# Patient Record
Sex: FEMALE | Race: WHITE | NOT HISPANIC OR LATINO | Employment: FULL TIME | URBAN - METROPOLITAN AREA
[De-identification: names, ages, dates, MRNs, and addresses within clinical notes are randomized per-mention and may not be internally consistent; named-entity substitution may affect disease eponyms.]

---

## 2020-05-06 ENCOUNTER — OFFICE VISIT (OUTPATIENT)
Dept: OBSTETRICS AND GYNECOLOGY | Facility: CLINIC | Age: 45
End: 2020-05-06
Payer: COMMERCIAL

## 2020-05-06 VITALS
WEIGHT: 103 LBS | HEART RATE: 80 BPM | DIASTOLIC BLOOD PRESSURE: 82 MMHG | HEIGHT: 60 IN | SYSTOLIC BLOOD PRESSURE: 127 MMHG | BODY MASS INDEX: 20.22 KG/M2

## 2020-05-06 DIAGNOSIS — L08.9 STAPH SKIN INFECTION: ICD-10-CM

## 2020-05-06 DIAGNOSIS — Z01.419 WELL WOMAN EXAM WITH ROUTINE GYNECOLOGICAL EXAM: Primary | ICD-10-CM

## 2020-05-06 DIAGNOSIS — B95.8 STAPH SKIN INFECTION: ICD-10-CM

## 2020-05-06 PROCEDURE — 99396 PREV VISIT EST AGE 40-64: CPT | Mod: S$GLB,,, | Performed by: OBSTETRICS & GYNECOLOGY

## 2020-05-06 PROCEDURE — 99396 PR PREVENTIVE VISIT,EST,40-64: ICD-10-PCS | Mod: S$GLB,,, | Performed by: OBSTETRICS & GYNECOLOGY

## 2020-05-06 RX ORDER — SODIUM FLUORIDE/POTASSIUM NIT 1.1 %-5 %
PASTE (ML) DENTAL
COMMUNITY
Start: 2020-05-05

## 2020-05-06 RX ORDER — DOXYCYCLINE HYCLATE 50 MG/1
CAPSULE ORAL
COMMUNITY
Start: 2020-05-04 | End: 2022-08-23

## 2020-05-06 RX ORDER — CYCLOSPORINE 0.5 MG/ML
EMULSION OPHTHALMIC
COMMUNITY
End: 2022-08-23

## 2020-05-06 RX ORDER — SULFAMETHOXAZOLE AND TRIMETHOPRIM 400; 80 MG/1; MG/1
1 TABLET ORAL 2 TIMES DAILY
Qty: 14 TABLET | Refills: 0 | Status: SHIPPED | OUTPATIENT
Start: 2020-05-06 | End: 2020-05-16

## 2020-05-06 RX ORDER — LEVOTHYROXINE SODIUM 50 UG/1
TABLET ORAL
COMMUNITY

## 2020-05-06 RX ORDER — VILAZODONE HYDROCHLORIDE 40 MG/1
TABLET ORAL
COMMUNITY
Start: 2020-04-06 | End: 2021-06-09 | Stop reason: DRUGHIGH

## 2020-05-06 RX ORDER — ERENUMAB-AOOE 70 MG/ML
INJECTION SUBCUTANEOUS
COMMUNITY
Start: 2020-03-20

## 2020-05-06 RX ORDER — MIRABEGRON 50 MG/1
TABLET, FILM COATED, EXTENDED RELEASE ORAL
COMMUNITY
Start: 2020-05-05 | End: 2022-11-11 | Stop reason: SDUPTHER

## 2020-05-06 RX ORDER — RIZATRIPTAN BENZOATE 10 MG/1
TABLET ORAL
COMMUNITY
End: 2023-06-29

## 2020-05-06 NOTE — PROGRESS NOTES
Lakisha Helms is a 44 y.o. female   presents for a well woman exam.  She has no issues, problems, or complaints.    Past Medical History:   Diagnosis Date    Hypothyroidism     IC (interstitial cystitis)     Migraines     Osteopenia        Past Surgical History:   Procedure Laterality Date    ABLATION  2017    Staci    AUGMENTATION OF BREAST       SECTION WITH TUBAL LIGATION       SECTION, LOW TRANSVERSE      COLONOSCOPY         OB History    Para Term  AB Living   2 2       2   SAB TAB Ectopic Multiple Live Births                  # Outcome Date GA Lbr Dennis/2nd Weight Sex Delivery Anes PTL Lv   2 Para            1 Para                 Family History   Problem Relation Age of Onset    Diabetes Paternal Grandmother     Pulmonary fibrosis Maternal Grandmother     Lupus Maternal Grandmother     Diabetes Maternal Grandfather        Social History     Tobacco Use    Smoking status: Never Smoker    Smokeless tobacco: Never Used   Substance Use Topics    Alcohol use: Never     Frequency: Never    Drug use: Never         Current Outpatient Medications:     AIMOVIG AUTOINJECTOR 70 mg/mL injection, , Disp: , Rfl:     cycloSPORINE (RESTASIS) 0.05 % ophthalmic emulsion, Restasis 0.05 % eye drops in a dropperette, Disp: , Rfl:     doxycycline (VIBRAMYCIN) 50 MG capsule, , Disp: , Rfl:     levothyroxine (SYNTHROID) 50 MCG tablet, Synthroid 50 mcg tablet, Disp: , Rfl:     MYRBETRIQ 50 mg Tb24, , Disp: , Rfl:     PREVIDENT 5000 ENAMEL PROTECT 1.1-5 % Pste, , Disp: , Rfl:     rizatriptan (MAXALT) 10 MG tablet, rizatriptan 10 mg tablet, Disp: , Rfl:     sulfamethoxazole-trimethoprim 400-80mg (BACTRIM) 400-80 mg per tablet, Take 1 tablet by mouth 2 (two) times daily. for 10 days, Disp: 14 tablet, Rfl: 0    VIIBRYD 40 mg Tab tablet, , Disp: , Rfl:      Review of patient's allergies indicates:   Allergen Reactions    Pcn [penicillins]     Reglan [metoclopramide]          ROS:  GENERAL: Denies weight gain or weight loss. Feeling well overall.   SKIN: + rash on back of thighs   HEAD: Denies head injury or headache.   NODES: Denies enlarged lymph nodes.   CHEST: Denies shortness of breath.   CARDIOVASCULAR: Denies palpitations or chest pain.   ABDOMEN: Denies abdominal pain, constipation, diarrhea, nausea, vomiting or rectal bleeding.   URINARY: Denies frequency, dysuria, hematuria, or burning on urination.  REPRODUCTIVE: See HPI.   BREASTS: Denies pain, lumps, or nipple discharge.   HEMATOLOGIC: Denies easy bruisability or excessive bleeding.  MUSCULOSKELETAL: Denies joint pain or swelling.   NEUROLOGIC: Denies syncope or weakness.   PSYCHIATRIC: Denies depression, anxiety or mood swings.    PHYSICAL EXAM:  /82   Pulse 80   Ht 5' (1.524 m)   Wt 46.7 kg (103 lb)   LMP 04/22/2020   BMI 20.12 kg/m²    Body mass index is 20.12 kg/m².   APPEARANCE: Well nourished, well developed, in no acute distress.  AFFECT: WNL, alert and oriented x 3  SKIN: No acne or hirsutism  NECK: Neck symmetric without masses or thyromegaly  NODES: No inguinal, cervical, axillary, or femoral lymph node enlargement  CHEST: Good respiratory effect  ABDOMEN: Soft.  No tenderness or masses.  No hepatosplenomegaly.  No hernias.  BREASTS: Symmetrical, no skin changes or visible lesions.  No palpable masses, nipple discharge bilaterally.  PELVIC: Normal external genitalia without lesions.  Normal hair distribution.  Adequate perineal body, normal urethral meatus.  Urethra and bladder without tenderness or masses. Vagina moist and well rugated without lesions or discharge.  Cervix pink, without lesions, discharge or tenderness.  No significant cystocele or rectocele.  Bimanual exam shows uterus to be normal size, regular, mobile and nontender.  Adnexa without masses or tenderness.    EXTREMITIES: No edema. Papular rash on posterior aspect of thighs     Well woman exam with routine gynecological exam  -      Liquid-based pap smear, screening    Staph skin infection  -     sulfamethoxazole-trimethoprim 400-80mg (BACTRIM) 400-80 mg per tablet; Take 1 tablet by mouth 2 (two) times daily. for 10 days  Dispense: 14 tablet; Refill: 0         Patient was counseled today on A.C.S. Pap guidelines and recommendations for yearly pelvic exams, mammograms and monthly self breast exams; to see her PCP for other health maintenance.   Mammogram and labwork with her PMD Dr. Daphne Bates done in March.   Will have hemorrhoid banding later this year.  Follow up in 1 year

## 2021-05-10 ENCOUNTER — PATIENT MESSAGE (OUTPATIENT)
Dept: OBSTETRICS AND GYNECOLOGY | Facility: CLINIC | Age: 46
End: 2021-05-10

## 2021-06-09 ENCOUNTER — OFFICE VISIT (OUTPATIENT)
Dept: OBSTETRICS AND GYNECOLOGY | Facility: CLINIC | Age: 46
End: 2021-06-09
Payer: COMMERCIAL

## 2021-06-09 VITALS
WEIGHT: 101.81 LBS | BODY MASS INDEX: 19.88 KG/M2 | SYSTOLIC BLOOD PRESSURE: 121 MMHG | DIASTOLIC BLOOD PRESSURE: 85 MMHG

## 2021-06-09 DIAGNOSIS — Z01.419 WELL WOMAN EXAM WITH ROUTINE GYNECOLOGICAL EXAM: Primary | ICD-10-CM

## 2021-06-09 PROCEDURE — 3008F BODY MASS INDEX DOCD: CPT | Mod: CPTII,S$GLB,, | Performed by: OBSTETRICS & GYNECOLOGY

## 2021-06-09 PROCEDURE — 3008F PR BODY MASS INDEX (BMI) DOCUMENTED: ICD-10-PCS | Mod: CPTII,S$GLB,, | Performed by: OBSTETRICS & GYNECOLOGY

## 2021-06-09 PROCEDURE — 99396 PREV VISIT EST AGE 40-64: CPT | Mod: S$GLB,,, | Performed by: OBSTETRICS & GYNECOLOGY

## 2021-06-09 PROCEDURE — 99396 PR PREVENTIVE VISIT,EST,40-64: ICD-10-PCS | Mod: S$GLB,,, | Performed by: OBSTETRICS & GYNECOLOGY

## 2021-06-09 RX ORDER — VILAZODONE HYDROCHLORIDE 20 MG/1
1 TABLET ORAL DAILY
COMMUNITY

## 2022-06-16 ENCOUNTER — OFFICE VISIT (OUTPATIENT)
Dept: OBSTETRICS AND GYNECOLOGY | Facility: CLINIC | Age: 47
End: 2022-06-16
Payer: COMMERCIAL

## 2022-06-16 VITALS — DIASTOLIC BLOOD PRESSURE: 88 MMHG | SYSTOLIC BLOOD PRESSURE: 102 MMHG | WEIGHT: 96 LBS | BODY MASS INDEX: 18.75 KG/M2

## 2022-06-16 DIAGNOSIS — N76.0 BV (BACTERIAL VAGINOSIS): ICD-10-CM

## 2022-06-16 DIAGNOSIS — B96.89 BV (BACTERIAL VAGINOSIS): ICD-10-CM

## 2022-06-16 DIAGNOSIS — Z01.419 WELL WOMAN EXAM WITH ROUTINE GYNECOLOGICAL EXAM: Primary | ICD-10-CM

## 2022-06-16 DIAGNOSIS — N91.2 AMENORRHEA: ICD-10-CM

## 2022-06-16 PROCEDURE — 3079F DIAST BP 80-89 MM HG: CPT | Mod: CPTII,S$GLB,, | Performed by: OBSTETRICS & GYNECOLOGY

## 2022-06-16 PROCEDURE — 1159F PR MEDICATION LIST DOCUMENTED IN MEDICAL RECORD: ICD-10-PCS | Mod: CPTII,S$GLB,, | Performed by: OBSTETRICS & GYNECOLOGY

## 2022-06-16 PROCEDURE — 99396 PREV VISIT EST AGE 40-64: CPT | Mod: S$GLB,,, | Performed by: OBSTETRICS & GYNECOLOGY

## 2022-06-16 PROCEDURE — 3008F BODY MASS INDEX DOCD: CPT | Mod: CPTII,S$GLB,, | Performed by: OBSTETRICS & GYNECOLOGY

## 2022-06-16 PROCEDURE — 3079F PR MOST RECENT DIASTOLIC BLOOD PRESSURE 80-89 MM HG: ICD-10-PCS | Mod: CPTII,S$GLB,, | Performed by: OBSTETRICS & GYNECOLOGY

## 2022-06-16 PROCEDURE — 87210 SMEAR WET MOUNT SALINE/INK: CPT | Mod: QW,S$GLB,, | Performed by: OBSTETRICS & GYNECOLOGY

## 2022-06-16 PROCEDURE — 3074F SYST BP LT 130 MM HG: CPT | Mod: CPTII,S$GLB,, | Performed by: OBSTETRICS & GYNECOLOGY

## 2022-06-16 PROCEDURE — 3008F PR BODY MASS INDEX (BMI) DOCUMENTED: ICD-10-PCS | Mod: CPTII,S$GLB,, | Performed by: OBSTETRICS & GYNECOLOGY

## 2022-06-16 PROCEDURE — 87210 PR  SMEAR,STAIN,WET MNT,INTERP: ICD-10-PCS | Mod: QW,S$GLB,, | Performed by: OBSTETRICS & GYNECOLOGY

## 2022-06-16 PROCEDURE — 3074F PR MOST RECENT SYSTOLIC BLOOD PRESSURE < 130 MM HG: ICD-10-PCS | Mod: CPTII,S$GLB,, | Performed by: OBSTETRICS & GYNECOLOGY

## 2022-06-16 PROCEDURE — 99396 PR PREVENTIVE VISIT,EST,40-64: ICD-10-PCS | Mod: S$GLB,,, | Performed by: OBSTETRICS & GYNECOLOGY

## 2022-06-16 PROCEDURE — 1159F MED LIST DOCD IN RCRD: CPT | Mod: CPTII,S$GLB,, | Performed by: OBSTETRICS & GYNECOLOGY

## 2022-06-16 RX ORDER — CLINDAMYCIN HYDROCHLORIDE 300 MG/1
300 CAPSULE ORAL 2 TIMES DAILY
Qty: 14 CAPSULE | Refills: 0 | Status: SHIPPED | OUTPATIENT
Start: 2022-06-16 | End: 2022-08-23

## 2022-06-16 NOTE — PROGRESS NOTES
Lakisha Helms is a 46 y.o. female  who presents for a well woman exam.  She has no issues, problems, or complaints. No menses since last May.  Some vaginal discomfort.     Past Medical History:   Diagnosis Date    Abnormal Pap smear of cervix     De Quervain's tenosynovitis     Hx of colonoscopy     Hypothyroidism     IC (interstitial cystitis)     Migraines     Osteopenia     Ovarian torsion     PCOS (polycystic ovarian syndrome)     Staph skin infection        Past Surgical History:   Procedure Laterality Date    ABLATION      Staci    AUGMENTATION OF BREAST       SECTION WITH TUBAL LIGATION       SECTION, LOW TRANSVERSE      COLONOSCOPY      , 22- repeat in 3 years    HAND SURGERY      LAPAROSCOPY      LOOP ELECTROSURGICAL EXCISION PROCEDURE (LEEP)         OB History    Para Term  AB Living   2 2       2   SAB IAB Ectopic Multiple Live Births                  # Outcome Date GA Lbr Dennis/2nd Weight Sex Delivery Anes PTL Lv   2 Para            1 Para                Family History   Problem Relation Age of Onset    Diabetes Paternal Grandmother     Pulmonary fibrosis Maternal Grandmother     Lupus Maternal Grandmother     Diabetes Maternal Grandfather        Social History     Tobacco Use    Smoking status: Never Smoker    Smokeless tobacco: Never Used   Substance Use Topics    Alcohol use: Not Currently    Drug use: Never         Current Outpatient Medications:     AIMOVIG AUTOINJECTOR 70 mg/mL injection, , Disp: , Rfl:     cycloSPORINE (RESTASIS) 0.05 % ophthalmic emulsion, Restasis 0.05 % eye drops in a dropperette, Disp: , Rfl:     doxycycline (VIBRAMYCIN) 50 MG capsule, , Disp: , Rfl:     levothyroxine (SYNTHROID) 50 MCG tablet, Synthroid 50 mcg tablet, Disp: , Rfl:     MYRBETRIQ 50 mg Tb24, , Disp: , Rfl:     PREVIDENT 5000 ENAMEL PROTECT 1.1-5 % Pste, , Disp: , Rfl:     rizatriptan (MAXALT) 10 MG tablet, rizatriptan 10 mg tablet,  Disp: , Rfl:     vilazodone (VIIBRYD) 20 mg Tab, Take 1 tablet by mouth once daily., Disp: , Rfl:     clindamycin (CLEOCIN) 300 MG capsule, Take 1 capsule (300 mg total) by mouth 2 (two) times a day., Disp: 14 capsule, Rfl: 0     Review of patient's allergies indicates:   Allergen Reactions    Pcn [penicillins]     Reglan [metoclopramide]         ROS:  GENERAL: Denies weight gain or weight loss. Feeling well overall.   SKIN: Denies rash or lesions.   HEAD: Denies head injury or headache.   NODES: Denies enlarged lymph nodes.   CHEST: Denies shortness of breath.   CARDIOVASCULAR: Denies palpitations or chest pain.   ABDOMEN: Denies abdominal pain, constipation, diarrhea, nausea, vomiting or rectal bleeding.   URINARY: Denies frequency, dysuria, hematuria, or burning on urination.  REPRODUCTIVE: See HPI.   BREASTS: Denies pain, lumps, or nipple discharge.   HEMATOLOGIC: Denies easy bruisability or excessive bleeding.  MUSCULOSKELETAL: Denies joint pain or swelling.   NEUROLOGIC: Denies syncope or weakness.   PSYCHIATRIC: Denies depression, anxiety or mood swings.    PHYSICAL EXAM:    /88   Wt 43.5 kg (96 lb)   BMI 18.75 kg/m²    Body mass index is 18.75 kg/m².     APPEARANCE: Well nourished, well developed, in no acute distress.  AFFECT: WNL, alert and oriented x 3  SKIN: No acne or hirsutism  NECK: Neck symmetric without masses or thyromegaly  NODES: No inguinal, cervical, axillary, or femoral lymph node enlargement  CHEST: Good respiratory effect  ABDOMEN: Soft.  No tenderness or masses.  No hepatosplenomegaly.  No hernias.  BREASTS: Symmetrical, no skin changes or visible lesions.  No palpable masses, nipple discharge bilaterally.  PELVIC: Normal external genitalia without lesions.  Normal hair distribution.  Adequate perineal body, normal urethral meatus.  Urethra and bladder without tenderness or masses. Vagina moist and well rugated without lesions or discharge.  Cervix pink, without lesions,  discharge or tenderness.  No significant cystocele or rectocele.  Bimanual exam shows uterus to be normal size, regular, mobile and nontender.  Adnexa without masses or tenderness.    EXTREMITIES: No edema.     Wet prep: many clue cells; negative for yeast or trichomonas        Well woman exam with routine gynecological exam  -     Liquid-based pap smear, screening    Amenorrhea  -     Follicle Stimulating Hormone; Future; Expected date: 06/23/2022  -     Luteinizing Hormone; Future; Expected date: 06/23/2022  -     Testosterone; Future; Expected date: 06/23/2022    BV (bacterial vaginosis)  -     clindamycin (CLEOCIN) 300 MG capsule; Take 1 capsule (300 mg total) by mouth 2 (two) times a day.  Dispense: 14 capsule; Refill: 0  -     POCT WET PREP    Mammogram with her PCP    Patient was counseled today on A.C.S. Pap guidelines and recommendations for yearly pelvic exams, mammograms and monthly self breast exams; to see her PCP for other health maintenance.     Follow up in 1 year

## 2022-08-20 LAB
FSH: 167 MIU/ML
LH: 92.8 MIU/ML
MULTIPLE ORDERS: NORMAL
TESTOST SERPL-MCNC: <2.5 NG/DL (ref 8.4–48.1)

## 2022-08-23 ENCOUNTER — OFFICE VISIT (OUTPATIENT)
Dept: OBSTETRICS AND GYNECOLOGY | Facility: CLINIC | Age: 47
End: 2022-08-23
Payer: COMMERCIAL

## 2022-08-23 VITALS
BODY MASS INDEX: 19.17 KG/M2 | HEART RATE: 80 BPM | SYSTOLIC BLOOD PRESSURE: 120 MMHG | WEIGHT: 97.63 LBS | HEIGHT: 60 IN | DIASTOLIC BLOOD PRESSURE: 80 MMHG

## 2022-08-23 DIAGNOSIS — B96.89 BV (BACTERIAL VAGINOSIS): ICD-10-CM

## 2022-08-23 DIAGNOSIS — N95.2 VAGINAL ATROPHY: ICD-10-CM

## 2022-08-23 DIAGNOSIS — N76.0 BV (BACTERIAL VAGINOSIS): ICD-10-CM

## 2022-08-23 DIAGNOSIS — N95.1 MENOPAUSAL SYMPTOMS: Primary | ICD-10-CM

## 2022-08-23 PROCEDURE — 87210 SMEAR WET MOUNT SALINE/INK: CPT | Mod: QW,S$GLB,, | Performed by: OBSTETRICS & GYNECOLOGY

## 2022-08-23 PROCEDURE — 87210 PR  SMEAR,STAIN,WET MNT,INTERP: ICD-10-PCS | Mod: QW,S$GLB,, | Performed by: OBSTETRICS & GYNECOLOGY

## 2022-08-23 PROCEDURE — 99214 PR OFFICE/OUTPT VISIT, EST, LEVL IV, 30-39 MIN: ICD-10-PCS | Mod: S$GLB,,, | Performed by: OBSTETRICS & GYNECOLOGY

## 2022-08-23 PROCEDURE — 1159F PR MEDICATION LIST DOCUMENTED IN MEDICAL RECORD: ICD-10-PCS | Mod: CPTII,S$GLB,, | Performed by: OBSTETRICS & GYNECOLOGY

## 2022-08-23 PROCEDURE — 3008F BODY MASS INDEX DOCD: CPT | Mod: CPTII,S$GLB,, | Performed by: OBSTETRICS & GYNECOLOGY

## 2022-08-23 PROCEDURE — 3074F SYST BP LT 130 MM HG: CPT | Mod: CPTII,S$GLB,, | Performed by: OBSTETRICS & GYNECOLOGY

## 2022-08-23 PROCEDURE — 3074F PR MOST RECENT SYSTOLIC BLOOD PRESSURE < 130 MM HG: ICD-10-PCS | Mod: CPTII,S$GLB,, | Performed by: OBSTETRICS & GYNECOLOGY

## 2022-08-23 PROCEDURE — 3079F DIAST BP 80-89 MM HG: CPT | Mod: CPTII,S$GLB,, | Performed by: OBSTETRICS & GYNECOLOGY

## 2022-08-23 PROCEDURE — 99214 OFFICE O/P EST MOD 30 MIN: CPT | Mod: S$GLB,,, | Performed by: OBSTETRICS & GYNECOLOGY

## 2022-08-23 PROCEDURE — 1159F MED LIST DOCD IN RCRD: CPT | Mod: CPTII,S$GLB,, | Performed by: OBSTETRICS & GYNECOLOGY

## 2022-08-23 PROCEDURE — 3008F PR BODY MASS INDEX (BMI) DOCUMENTED: ICD-10-PCS | Mod: CPTII,S$GLB,, | Performed by: OBSTETRICS & GYNECOLOGY

## 2022-08-23 PROCEDURE — 3079F PR MOST RECENT DIASTOLIC BLOOD PRESSURE 80-89 MM HG: ICD-10-PCS | Mod: CPTII,S$GLB,, | Performed by: OBSTETRICS & GYNECOLOGY

## 2022-08-23 RX ORDER — ESTERIFIED ESTROGEN AND METHYLTESTOSTERONE .625; 1.25 MG/1; MG/1
1 TABLET ORAL DAILY
Qty: 30 TABLET | Refills: 5 | Status: SHIPPED | OUTPATIENT
Start: 2022-08-23 | End: 2022-12-01 | Stop reason: ALTCHOICE

## 2022-08-23 RX ORDER — ESTRADIOL 0.1 MG/G
1 CREAM VAGINAL
Qty: 42.5 G | Refills: 3 | Status: SHIPPED | OUTPATIENT
Start: 2022-08-25 | End: 2023-06-29

## 2022-08-23 RX ORDER — SPIRONOLACTONE 50 MG/1
50 TABLET, FILM COATED ORAL DAILY
COMMUNITY
Start: 2022-07-28

## 2022-08-23 RX ORDER — PROGESTERONE 100 MG/1
100 CAPSULE ORAL NIGHTLY
Qty: 30 CAPSULE | Refills: 5 | Status: SHIPPED | OUTPATIENT
Start: 2022-08-23 | End: 2023-03-09 | Stop reason: ALTCHOICE

## 2022-08-23 NOTE — PROGRESS NOTES
Chief Complaint: vaginal dryness     HPI:      Lakisha Helms is a 47 y.o. female  who presents complaining of vaginal dryness even after BV treatment.  She recently had labs showing menopause and she has not had a period since May 2021.  She also has hot flashes, night sweats, disrupted sleep and fatigue/anhedonia. She is interested in hormone replacement but worried about her history of severe acne.     Past Medical History:   Diagnosis Date    Abnormal Pap smear of cervix     Benign colon polyp     De Quervain's tenosynovitis     Diverticulosis     Hx of colonoscopy     Hypothyroidism     IC (interstitial cystitis)     Migraines     Osteopenia     Ovarian torsion     PCOS (polycystic ovarian syndrome)     Staph skin infection       Past Surgical History:   Procedure Laterality Date    ABLATION      Staci    AUGMENTATION OF BREAST       SECTION WITH TUBAL LIGATION       SECTION, LOW TRANSVERSE      COLONOSCOPY      , 22- repeat in 3 years    HAND SURGERY      LAPAROSCOPY      LOOP ELECTROSURGICAL EXCISION PROCEDURE (LEEP)      TUBAL LIGATION      WISDOM TOOTH EXTRACTION        Social History     Tobacco Use    Smoking status: Never Smoker    Smokeless tobacco: Never Used   Substance Use Topics    Alcohol use: Not Currently    Drug use: Never      Current Outpatient Medications on File Prior to Visit   Medication Sig Dispense Refill    AIMOVIG AUTOINJECTOR 70 mg/mL injection       levothyroxine (SYNTHROID) 50 MCG tablet Synthroid 50 mcg tablet      MYRBETRIQ 50 mg Tb24       PREVIDENT 5000 ENAMEL PROTECT 1.1-5 % Pste       rizatriptan (MAXALT) 10 MG tablet rizatriptan 10 mg tablet      spironolactone (ALDACTONE) 50 MG tablet Take 50 mg by mouth once daily.      vilazodone (VIIBRYD) 20 mg Tab Take 1 tablet by mouth once daily.      [DISCONTINUED] clindamycin (CLEOCIN) 300 MG capsule Take 1 capsule (300 mg total) by mouth 2 (two) times a day. 14  capsule 0    [DISCONTINUED] cycloSPORINE (RESTASIS) 0.05 % ophthalmic emulsion Restasis 0.05 % eye drops in a dropperette      [DISCONTINUED] doxycycline (VIBRAMYCIN) 50 MG capsule        No current facility-administered medications on file prior to visit.      Review of patient's allergies indicates:   Allergen Reactions    Pcn [penicillins]     Reglan [metoclopramide]           ROS:     GENERAL: Denies weight gain or weight loss. Feeling well overall.   SKIN: Denies rash or lesions.   NODES: Denies enlarged lymph nodes.   CARDIOVASCULAR: Denies palpitations or chest pain.   ABDOMEN: Denies abdominal pain, constipation, diarrhea, nausea, vomiting or rectal bleeding.   URINARY: Denies frequency, dysuria, hematuria, or burning on urination.  REPRODUCTIVE: See HPI.   BREASTS: Denies pain, lumps, or nipple discharge.   HEMATOLOGIC: Denies easy bruisability or excessive bleeding with the exception of menstrual cycles.  PSYCHIATRIC: Denies depression, anxiety or mood swings.   Physical Exam:      /80 (BP Location: Left arm, Patient Position: Sitting, BP Method: Medium (Manual))   Pulse 80   Ht 5' (1.524 m)   Wt 44.3 kg (97 lb 9.6 oz)   LMP 05/01/2021 (Approximate)   BMI 19.06 kg/m²   Body mass index is 19.06 kg/m².       PELVIC: Normal external genitalia without lesions.  Normal hair distribution.  Adequate perineal body, normal urethral meatus.  Urethra and bladder without tenderness or masses. Vagina atrophic without lesions or discharge.  Cervix pink, without lesions, discharge or tenderness.       Results:      Wet prep: few cells visible        Assessment/Plan:     Menopausal symptoms  -     estrogens,esterified,-methyltestosterone 0.625-1.25mg (ESTRATEST HS) per tablet; Take 1 tablet by mouth once daily.  Dispense: 30 tablet; Refill: 5  -     progesterone (PROMETRIUM) 100 MG capsule; Take 1 capsule (100 mg total) by mouth nightly.  Dispense: 30 capsule; Refill: 5    BV (bacterial vaginosis)  -      POCT WET PREP    Vaginal atrophy  -     estradioL (ESTRACE) 0.01 % (0.1 mg/gram) vaginal cream; Place 1 g vaginally twice a week.  Dispense: 42.5 g; Refill: 3

## 2022-10-06 ENCOUNTER — TELEPHONE (OUTPATIENT)
Dept: UROLOGY | Facility: CLINIC | Age: 47
End: 2022-10-06
Payer: COMMERCIAL

## 2022-10-06 NOTE — TELEPHONE ENCOUNTER
----- Message from Nathalie Mo sent at 10/6/2022  8:15 AM CDT -----  Regarding: Sooner Appointment  Contact: patient  Per phone call with patient  she stated that she is in a  IC Flare and she would like to have the DMSO treatment.  She had this when she was under Dr Bailey.  Please return call at [phone] (home).    Thanks,  SJ

## 2022-10-06 NOTE — TELEPHONE ENCOUNTER
Informed pt since she has not been seen by Dr. Mendiola and is considered a new pt she will need an appt before we can set up any treatments. She was ok with seeing NP, appt made pt agreed with date/time. lpn

## 2022-10-07 ENCOUNTER — TELEPHONE (OUTPATIENT)
Dept: OBSTETRICS AND GYNECOLOGY | Facility: CLINIC | Age: 47
End: 2022-10-07
Payer: COMMERCIAL

## 2022-10-07 DIAGNOSIS — R30.0 DYSURIA: Primary | ICD-10-CM

## 2022-10-07 NOTE — TELEPHONE ENCOUNTER
C/o lower pelvic pain/frequent urination at night/possible cystitis again per pt.  Pt. Does have upcoming appt. With urologist.    If you want to do Urinalysis she will go to [facility] ([facility])

## 2022-11-11 ENCOUNTER — OFFICE VISIT (OUTPATIENT)
Dept: UROLOGY | Facility: CLINIC | Age: 47
End: 2022-11-11
Payer: COMMERCIAL

## 2022-11-11 VITALS
BODY MASS INDEX: 19.2 KG/M2 | DIASTOLIC BLOOD PRESSURE: 81 MMHG | HEART RATE: 83 BPM | HEIGHT: 60 IN | WEIGHT: 97.81 LBS | SYSTOLIC BLOOD PRESSURE: 123 MMHG

## 2022-11-11 DIAGNOSIS — N32.81 OAB (OVERACTIVE BLADDER): ICD-10-CM

## 2022-11-11 DIAGNOSIS — N30.10 INTERSTITIAL CYSTITIS: Primary | ICD-10-CM

## 2022-11-11 PROCEDURE — 3074F SYST BP LT 130 MM HG: CPT | Mod: CPTII,S$GLB,, | Performed by: NURSE PRACTITIONER

## 2022-11-11 PROCEDURE — 99204 PR OFFICE/OUTPT VISIT, NEW, LEVL IV, 45-59 MIN: ICD-10-PCS | Mod: S$GLB,,, | Performed by: NURSE PRACTITIONER

## 2022-11-11 PROCEDURE — 1159F PR MEDICATION LIST DOCUMENTED IN MEDICAL RECORD: ICD-10-PCS | Mod: CPTII,S$GLB,, | Performed by: NURSE PRACTITIONER

## 2022-11-11 PROCEDURE — 3079F PR MOST RECENT DIASTOLIC BLOOD PRESSURE 80-89 MM HG: ICD-10-PCS | Mod: CPTII,S$GLB,, | Performed by: NURSE PRACTITIONER

## 2022-11-11 PROCEDURE — 3008F BODY MASS INDEX DOCD: CPT | Mod: CPTII,S$GLB,, | Performed by: NURSE PRACTITIONER

## 2022-11-11 PROCEDURE — 1159F MED LIST DOCD IN RCRD: CPT | Mod: CPTII,S$GLB,, | Performed by: NURSE PRACTITIONER

## 2022-11-11 PROCEDURE — 99204 OFFICE O/P NEW MOD 45 MIN: CPT | Mod: S$GLB,,, | Performed by: NURSE PRACTITIONER

## 2022-11-11 PROCEDURE — 3074F PR MOST RECENT SYSTOLIC BLOOD PRESSURE < 130 MM HG: ICD-10-PCS | Mod: CPTII,S$GLB,, | Performed by: NURSE PRACTITIONER

## 2022-11-11 PROCEDURE — 1160F RVW MEDS BY RX/DR IN RCRD: CPT | Mod: CPTII,S$GLB,, | Performed by: NURSE PRACTITIONER

## 2022-11-11 PROCEDURE — 1160F PR REVIEW ALL MEDS BY PRESCRIBER/CLIN PHARMACIST DOCUMENTED: ICD-10-PCS | Mod: CPTII,S$GLB,, | Performed by: NURSE PRACTITIONER

## 2022-11-11 PROCEDURE — 3079F DIAST BP 80-89 MM HG: CPT | Mod: CPTII,S$GLB,, | Performed by: NURSE PRACTITIONER

## 2022-11-11 PROCEDURE — 3008F PR BODY MASS INDEX (BMI) DOCUMENTED: ICD-10-PCS | Mod: CPTII,S$GLB,, | Performed by: NURSE PRACTITIONER

## 2022-11-11 RX ORDER — MIRABEGRON 50 MG/1
50 TABLET, FILM COATED, EXTENDED RELEASE ORAL DAILY
Qty: 90 TABLET | Refills: 3 | Status: SHIPPED | OUTPATIENT
Start: 2022-11-11 | End: 2023-02-28 | Stop reason: SDUPTHER

## 2022-11-11 RX ORDER — PRUCALOPRIDE 2 MG/1
TABLET, FILM COATED ORAL
COMMUNITY
Start: 2022-10-21

## 2022-11-11 NOTE — PROGRESS NOTES
Subjective:       Patient ID: Lakisha Helms is a 47 y.o. female.    Chief Complaint: Other (IC/former J pt /was having a flare when appt made-not now)      HPI: 47-year-old female, new to [facility].    Patient formally seen by Dr. Berry and Dr. Bailey.    She has a history of interstitial cystitis and overactive bladder.    Currently maintain on Myrbetriq 50 mg daily.      She has had DMSO treatments in the past.  She has also has hydrodistention in the past.    Reports better response to hydrodistention than DMSO.    She has experimented with an IC diet in the past with no significant improvement.    She does admit coffee can be an irritant.      At this time she is doing well with no complaints or complications.    States he had a recent flare up proximally 1 month ago.       Past Medical History:   Past Medical History:   Diagnosis Date    Abnormal Pap smear of cervix     Benign colon polyp     De Quervain's tenosynovitis     Diverticulosis     Hypothyroidism     IC (interstitial cystitis)     Migraines     Osteopenia     Ovarian torsion     PCOS (polycystic ovarian syndrome)     Staph skin infection        Past Surgical Historical:   Past Surgical History:   Procedure Laterality Date    ABLATION  2017    Staci    AUGMENTATION OF BREAST       SECTION WITH TUBAL LIGATION       SECTION, LOW TRANSVERSE      COLONOSCOPY      , 22- repeat in 3 years    HAND SURGERY      LAPAROSCOPY      LOOP ELECTROSURGICAL EXCISION PROCEDURE (LEEP)      TUBAL LIGATION      WISDOM TOOTH EXTRACTION          Medications:   Medication List with Changes/Refills   New Medications    METHEN-M.BLUE-S.PHOS-PHSAL-HYO (URIBEL) 118-10-40.8-36 MG CAP    Take 1 capsule by mouth 3 (three) times daily as needed (bladder pain).   Current Medications    AIMOVIG AUTOINJECTOR 70 MG/ML INJECTION        ESTRADIOL (ESTRACE) 0.01 % (0.1 MG/GRAM) VAGINAL CREAM    Place 1 g vaginally twice a week.     ESTROGENS,ESTERIFIED,-METHYLTESTOSTERONE 0.625-1.25MG (ESTRATEST HS) PER TABLET    Take 1 tablet by mouth once daily.    LEVOTHYROXINE (SYNTHROID) 50 MCG TABLET    Synthroid 50 mcg tablet    MOTEGRITY 2 MG TAB        PREVIDENT 5000 ENAMEL PROTECT 1.1-5 % PSTE        PROGESTERONE (PROMETRIUM) 100 MG CAPSULE    Take 1 capsule (100 mg total) by mouth nightly.    RIZATRIPTAN (MAXALT) 10 MG TABLET    rizatriptan 10 mg tablet    SPIRONOLACTONE (ALDACTONE) 50 MG TABLET    Take 50 mg by mouth once daily.    VILAZODONE (VIIBRYD) 20 MG TAB    Take 1 tablet by mouth once daily.   Changed and/or Refilled Medications    Modified Medication Previous Medication    MYRBETRIQ 50 MG TB24 MYRBETRIQ 50 mg Tb24       Take 1 tablet (50 mg total) by mouth Daily.            Past Social History:   Social History     Socioeconomic History    Marital status:    Tobacco Use    Smoking status: Never    Smokeless tobacco: Never   Substance and Sexual Activity    Alcohol use: Not Currently    Drug use: Never    Sexual activity: Yes     Partners: Male     Birth control/protection: None       Allergies:   Review of patient's allergies indicates:   Allergen Reactions    Pcn [penicillins]     Reglan [metoclopramide]         Family History:   Family History   Problem Relation Age of Onset    Diabetes Paternal Grandmother     Pulmonary fibrosis Maternal Grandmother     Lupus Maternal Grandmother     Diabetes Maternal Grandfather     Metabolic syndrome Father     Arthritis Father     Diabetes type II Mother         Review of Systems:  Review of Systems   Constitutional:  Negative for activity change and appetite change.   HENT:  Negative for congestion and dental problem.    Respiratory:  Negative for chest tightness and shortness of breath.    Cardiovascular:  Negative for chest pain.   Gastrointestinal:  Negative for abdominal distention and abdominal pain.   Genitourinary:  Negative for decreased urine volume, difficulty urinating,  dyspareunia, dysuria, enuresis, flank pain, frequency, genital sores, hematuria, pelvic pain and urgency.   Musculoskeletal:  Negative for back pain and neck pain.   Allergic/Immunologic: Negative for immunocompromised state.   Neurological:  Negative for dizziness.   Hematological:  Negative for adenopathy.   Psychiatric/Behavioral:  Negative for agitation, behavioral problems and confusion.      Physical Exam:  Physical Exam  Vitals and nursing note reviewed.   Constitutional:       Appearance: She is well-developed.   HENT:      Head: Normocephalic.   Eyes:      Pupils: Pupils are equal, round, and reactive to light.   Cardiovascular:      Rate and Rhythm: Normal rate and regular rhythm.      Heart sounds: Normal heart sounds.   Pulmonary:      Effort: Pulmonary effort is normal.      Breath sounds: Normal breath sounds.   Abdominal:      General: Bowel sounds are normal.      Palpations: Abdomen is soft.   Musculoskeletal:         General: Normal range of motion.      Cervical back: Normal range of motion and neck supple.   Skin:     General: Skin is warm and dry.   Neurological:      Mental Status: She is alert and oriented to person, place, and time.   Psychiatric:         Mood and Affect: Mood normal.         Behavior: Behavior normal.     Urinalysis:  Normal     Assessment/Plan:   Interstitial cystitis/overactive bladder:  Will refill patient's Myrbetriq 50 mg daily.  Patient will review IC diet.    We did discuss repeating DMSO or hydrodistention.  Also discussed IC 2 caps and Botox.    At this time patient is doing well.  He has been having IC and OB for a long time and does well for the most part.    Patient provided Uribel to use as needed.    Discussed the importance of hydration.    Patient will notify us for any flare-up or UTI symptoms.      Plan follow-up in 1 year, sooner if needed.  Problem List Items Addressed This Visit    None  Visit Diagnoses       Interstitial cystitis    -  Primary     Relevant Medications    methen-m.blue-s.phos-phsal-hyo (URIBEL) 118-10-40.8-36 mg Cap    MYRBETRIQ 50 mg Tb24    Other Relevant Orders    POCT Urinalysis (w/Micro Option)    OAB (overactive bladder)        Relevant Medications    methen-m.blue-s.phos-phsal-hyo (URIBEL) 118-10-40.8-36 mg Cap    MYRBETRIQ 50 mg Tb24    Other Relevant Orders    POCT Urinalysis (w/Micro Option)

## 2022-12-01 ENCOUNTER — TELEPHONE (OUTPATIENT)
Dept: OBSTETRICS AND GYNECOLOGY | Facility: CLINIC | Age: 47
End: 2022-12-01
Payer: COMMERCIAL

## 2022-12-01 DIAGNOSIS — N95.1 MENOPAUSAL SYMPTOMS: Primary | ICD-10-CM

## 2022-12-01 RX ORDER — TESTOSTERONE
POWDER (GRAM) MISCELLANEOUS
Qty: 30 G | Refills: 5 | Status: SHIPPED | OUTPATIENT
Start: 2022-12-01 | End: 2023-06-30

## 2022-12-01 RX ORDER — ESTRADIOL 0.5 MG/1
0.5 TABLET ORAL DAILY
Qty: 30 TABLET | Refills: 0 | Status: SHIPPED | OUTPATIENT
Start: 2022-12-01 | End: 2023-01-06

## 2022-12-01 NOTE — TELEPHONE ENCOUNTER
Patient instructed to d/c Estratest and will start estradiol instead. Will add low dose testosterone cream after acne has resolved

## 2022-12-01 NOTE — TELEPHONE ENCOUNTER
Started on HRT (low dose) so it wouldn't cause acne.  Pt. C/o since last week she has had 3 outbreaks (cysts).  Seeing Dr. Sage and he feels like it is hormone related.  Was put on Doxycycline and now has another one.  Should she decrease dose of Estratest?  Please advise.

## 2022-12-01 NOTE — TELEPHONE ENCOUNTER
----- Message from Daisy Way sent at 12/1/2022  1:55 PM CST -----  Contact: self  Pt called and asked for a call back regarding side effects she is having from her hormone medication.   Please call [phone]

## 2023-01-09 DIAGNOSIS — N95.1 MENOPAUSAL SYMPTOMS: ICD-10-CM

## 2023-01-09 RX ORDER — ESTRADIOL 0.5 MG/1
0.5 TABLET ORAL DAILY
Qty: 30 TABLET | Refills: 12 | Status: SHIPPED | OUTPATIENT
Start: 2023-01-09 | End: 2023-02-07 | Stop reason: SDUPTHER

## 2023-02-27 ENCOUNTER — PATIENT MESSAGE (OUTPATIENT)
Dept: OBSTETRICS AND GYNECOLOGY | Facility: CLINIC | Age: 48
End: 2023-02-27
Payer: COMMERCIAL

## 2023-02-27 DIAGNOSIS — N95.1 MENOPAUSAL SYMPTOMS: Primary | ICD-10-CM

## 2023-02-28 ENCOUNTER — PATIENT MESSAGE (OUTPATIENT)
Dept: OBSTETRICS AND GYNECOLOGY | Facility: CLINIC | Age: 48
End: 2023-02-28
Payer: COMMERCIAL

## 2023-02-28 DIAGNOSIS — N30.10 INTERSTITIAL CYSTITIS: ICD-10-CM

## 2023-02-28 DIAGNOSIS — N32.81 OAB (OVERACTIVE BLADDER): ICD-10-CM

## 2023-02-28 RX ORDER — MIRABEGRON 50 MG/1
50 TABLET, FILM COATED, EXTENDED RELEASE ORAL DAILY
Qty: 90 TABLET | Refills: 3 | Status: CANCELLED | OUTPATIENT
Start: 2023-02-28 | End: 2024-02-28

## 2023-02-28 RX ORDER — MIRABEGRON 50 MG/1
50 TABLET, FILM COATED, EXTENDED RELEASE ORAL DAILY
Qty: 30 TABLET | Refills: 0 | Status: SHIPPED | OUTPATIENT
Start: 2023-02-28 | End: 2023-03-30

## 2023-02-28 RX ORDER — MIRABEGRON 50 MG/1
50 TABLET, FILM COATED, EXTENDED RELEASE ORAL DAILY
Qty: 90 TABLET | Refills: 3 | Status: SHIPPED | OUTPATIENT
Start: 2023-02-28 | End: 2024-02-28

## 2023-03-06 LAB
E2: <12.2 PG/ML
FREE TESTOSTERONE: NORMAL
MULTIPLE ORDERS: NORMAL
PROGEST SERPL-MCNC: 0.67 NG/ML

## 2023-03-07 ENCOUNTER — PATIENT MESSAGE (OUTPATIENT)
Dept: OBSTETRICS AND GYNECOLOGY | Facility: CLINIC | Age: 48
End: 2023-03-07
Payer: COMMERCIAL

## 2023-03-07 ENCOUNTER — PATIENT MESSAGE (OUTPATIENT)
Dept: UROLOGY | Facility: CLINIC | Age: 48
End: 2023-03-07
Payer: COMMERCIAL

## 2023-03-07 DIAGNOSIS — N91.2 AMENORRHEA: Primary | ICD-10-CM

## 2023-03-08 ENCOUNTER — PROCEDURE VISIT (OUTPATIENT)
Dept: OBSTETRICS AND GYNECOLOGY | Facility: CLINIC | Age: 48
End: 2023-03-08
Payer: COMMERCIAL

## 2023-03-08 DIAGNOSIS — N91.2 AMENORRHEA: ICD-10-CM

## 2023-03-08 PROCEDURE — 76830 TRANSVAGINAL US NON-OB: CPT | Mod: S$GLB,,, | Performed by: OBSTETRICS & GYNECOLOGY

## 2023-03-08 PROCEDURE — 76830 US OB/GYN PROCEDURE (VIEWPOINT): ICD-10-PCS | Mod: S$GLB,,, | Performed by: OBSTETRICS & GYNECOLOGY

## 2023-03-09 ENCOUNTER — PATIENT MESSAGE (OUTPATIENT)
Dept: OBSTETRICS AND GYNECOLOGY | Facility: CLINIC | Age: 48
End: 2023-03-09
Payer: COMMERCIAL

## 2023-03-09 DIAGNOSIS — N95.1 MENOPAUSAL SYMPTOMS: Primary | ICD-10-CM

## 2023-03-09 RX ORDER — ESTRADIOL AND NORETHINDRONE ACETATE .5; .1 MG/1; MG/1
1 TABLET ORAL DAILY
Qty: 84 TABLET | Refills: 3 | Status: SHIPPED | OUTPATIENT
Start: 2023-03-09 | End: 2023-03-09 | Stop reason: DRUGHIGH

## 2023-03-09 RX ORDER — ESTRADIOL AND NORETHINDRONE ACETATE 1; .5 MG/1; MG/1
1 TABLET ORAL DAILY
Qty: 84 TABLET | Refills: 3 | Status: SHIPPED | OUTPATIENT
Start: 2023-03-09 | End: 2024-03-08

## 2023-05-26 ENCOUNTER — PATIENT MESSAGE (OUTPATIENT)
Dept: OBSTETRICS AND GYNECOLOGY | Facility: CLINIC | Age: 48
End: 2023-05-26
Payer: COMMERCIAL

## 2023-06-14 ENCOUNTER — PATIENT MESSAGE (OUTPATIENT)
Dept: OBSTETRICS AND GYNECOLOGY | Facility: CLINIC | Age: 48
End: 2023-06-14
Payer: COMMERCIAL

## 2023-06-29 RX ORDER — DOXYCYCLINE HYCLATE 50 MG/1
CAPSULE ORAL
COMMUNITY
Start: 2023-05-25

## 2023-06-29 RX ORDER — UBROGEPANT 50 MG/1
TABLET ORAL
COMMUNITY
Start: 2023-03-07

## 2023-06-30 ENCOUNTER — OFFICE VISIT (OUTPATIENT)
Dept: OBSTETRICS AND GYNECOLOGY | Facility: CLINIC | Age: 48
End: 2023-06-30
Payer: COMMERCIAL

## 2023-06-30 VITALS
HEIGHT: 60 IN | BODY MASS INDEX: 19.83 KG/M2 | SYSTOLIC BLOOD PRESSURE: 124 MMHG | WEIGHT: 101 LBS | DIASTOLIC BLOOD PRESSURE: 85 MMHG

## 2023-06-30 DIAGNOSIS — Z12.31 BREAST CANCER SCREENING BY MAMMOGRAM: ICD-10-CM

## 2023-06-30 DIAGNOSIS — Z01.419 ENCOUNTER FOR WELL WOMAN EXAM WITH ROUTINE GYNECOLOGICAL EXAM: Primary | ICD-10-CM

## 2023-06-30 DIAGNOSIS — N95.2 ATROPHIC VAGINITIS: ICD-10-CM

## 2023-06-30 PROCEDURE — 3074F SYST BP LT 130 MM HG: CPT | Mod: CPTII,S$GLB,, | Performed by: OBSTETRICS & GYNECOLOGY

## 2023-06-30 PROCEDURE — 3008F PR BODY MASS INDEX (BMI) DOCUMENTED: ICD-10-PCS | Mod: CPTII,S$GLB,, | Performed by: OBSTETRICS & GYNECOLOGY

## 2023-06-30 PROCEDURE — 3074F PR MOST RECENT SYSTOLIC BLOOD PRESSURE < 130 MM HG: ICD-10-PCS | Mod: CPTII,S$GLB,, | Performed by: OBSTETRICS & GYNECOLOGY

## 2023-06-30 PROCEDURE — 1159F PR MEDICATION LIST DOCUMENTED IN MEDICAL RECORD: ICD-10-PCS | Mod: CPTII,S$GLB,, | Performed by: OBSTETRICS & GYNECOLOGY

## 2023-06-30 PROCEDURE — 1159F MED LIST DOCD IN RCRD: CPT | Mod: CPTII,S$GLB,, | Performed by: OBSTETRICS & GYNECOLOGY

## 2023-06-30 PROCEDURE — 3008F BODY MASS INDEX DOCD: CPT | Mod: CPTII,S$GLB,, | Performed by: OBSTETRICS & GYNECOLOGY

## 2023-06-30 PROCEDURE — 99396 PREV VISIT EST AGE 40-64: CPT | Mod: S$GLB,,, | Performed by: OBSTETRICS & GYNECOLOGY

## 2023-06-30 PROCEDURE — 3079F DIAST BP 80-89 MM HG: CPT | Mod: CPTII,S$GLB,, | Performed by: OBSTETRICS & GYNECOLOGY

## 2023-06-30 PROCEDURE — 99396 PR PREVENTIVE VISIT,EST,40-64: ICD-10-PCS | Mod: S$GLB,,, | Performed by: OBSTETRICS & GYNECOLOGY

## 2023-06-30 PROCEDURE — 3079F PR MOST RECENT DIASTOLIC BLOOD PRESSURE 80-89 MM HG: ICD-10-PCS | Mod: CPTII,S$GLB,, | Performed by: OBSTETRICS & GYNECOLOGY

## 2023-06-30 NOTE — PROGRESS NOTES
CC:  WELL WOMAN (menopausal since )  Patient Care Team:  Daphen Bates MD as PCP - General (Internal Medicine)  Jessica Gao MD (Gastroenterology)  Pranav Hernandez NP as Nurse Practitioner (Urology)    NEW PATIENT       HPI:  Patient is a 48 y.o. who presents for her well woman exam today.  History reviewed with patient. Has tried estratest in the past and felt too irritable and had bad cystic acne. Got off the testosterone and changed jobs and feels fine. Has est vag cream which helps but doesn't like cleaning the applicator. Discussed other options and will use up the rest of her cream and then call and switch to something else. Dr carrillo sent out jjs test cream but never used and doesn't feel like she needs it. Will have mmg and dexa reports sent here  Patient is without complaints or concerns today.     HRT:  combination HRT and and testosterone  HX ABNL PAPS: normal after LEEP-     REVIEW OF PRIOR DATA/ HEALTH MAINTENANCE:  LAST ANNUAL:   2022    LAST MMG (screening)- May 2023- normal at [facility]    LAST LABS- up to date with PCP     LAST COLONOSCOPY- - by Dr. Gao - q 3 yrs for polyps   LAST DEXA- - normal at [facility]      Past Medical History:   Diagnosis Date    Abnormal Pap smear of cervix     Benign colon polyp     De Quervain's tenosynovitis     Diverticulosis     Hypothyroidism     IC (interstitial cystitis)     Migraines     Osteopenia     Ovarian torsion     PCOS (polycystic ovarian syndrome)     Staph skin infection      SURGICAL HX:   has a past surgical history that includes Augmentation of breast; Endometrial ablation ();  section with tubal ligation;  section, low transverse; Colonoscopy; Hand surgery; Loop electrosurgical excision procedure (LEEP) (); Laparoscopy; [place] tooth extraction; and Tubal ligation.    SOCIAL HX:    reports that she has never smoked. She has never used smokeless tobacco. She reports that she does not currently use  alcohol. She reports that she does not use drugs.    FAMILY HX:   family history includes Arthritis in her father; Diabetes in her maternal grandfather and paternal grandmother; Diabetes type II in her mother; Lupus in her maternal grandmother; Metabolic syndrome in her father; Pulmonary fibrosis in her maternal grandmother. .    ALLERGIES:  Pcn [penicillins] and Reglan [metoclopramide]    Current Outpatient Medications   Medication Instructions    AIMOVIG AUTOINJECTOR 70 mg/mL injection No dose, route, or frequency recorded.    doxycycline 50 MG capsule TAKE ONE CAPSULE ONCE A DAY MAY TAKE TWICE A DAY IF FLARE    estradiol-norethindrone (ACTIVELLA) 1-0.5 mg per tablet 1 tablet, Oral, Daily    levothyroxine (SYNTHROID) 50 MCG tablet Synthroid 50 mcg tablet    methen-m.blue-s.phos-phsal-hyo (URIBEL) 118-10-40.8-36 mg Cap 1 capsule, Oral, 3 times daily PRN    MOTEGRITY 2 mg Tab No dose, route, or frequency recorded.    MYRBETRIQ 50 mg, Oral, Daily    PREVIDENT 5000 ENAMEL PROTECT 1.1-5 % Pste No dose, route, or frequency recorded.    spironolactone (ALDACTONE) 50 mg, Oral, Daily    UBRELVY 50 mg tablet TAKE 1 TABLET BY MOUTH ONCE A DAY AS NEEDED FOR MIGRAINE HEADACHE    vilazodone (VIIBRYD) 20 mg Tab 1 tablet, Oral, Daily     ROS:  CONST:  No fever, chills, fatigue or unexpected changes in weight   CV: No chest pain or palpitations   RESP:  No shortness of breath or cough   GI: No abd pain, vomiting, diarrhea, blood in stool, or changes in bowel mvmts   SKIN: No rashes or lesions  MUSCULOSKELETAL: No joint swelling or pain   PSYCH: No changes in mood or insomia   BREASTS: No asymmetry, lumps, pain, nipple discharge, or skin changes   :  No dysuria, urgency, frequency, hematuria or incontinence           No vag dc, itching, odor or dryness           No pelvic pain, dyspareunia, or abnormal vaginal bleeding     VITALS:  Blood pressure 124/85, height 5' (1.524 m), weight 45.8 kg (101 lb), last menstrual period  05/01/2021.  Body mass index is 19.73 kg/m².     PHYSICAL EXAM-  APPEARANCE: Well appearing, in no acute distress.   NECK: Neck symmetric   CV:  Normal rate   PULM: Normal resp rate, no resp distress, normal resp effort   PSYCH:  Normal mood and affect, cooperative   SKIN: No rashes, lesions, or abnormal bruising   LYMPH: No inguinal or axillary adenopathy   ABD: Soft, without tenderness or masses.   BREAST: Symmetrical, no nipple changes, no skin changes, No palpable masses   PELVIC:  VULVA: Normal female genitalia. No lesions.   URETHRAL MEATUS: No masses, no significant prolapse.   BLADDER/ URETHRA: No masses or suprapubic tenderness   VAGINA: No lesions. +atrophic changes. No discharge   CVX: No lesions   UTERUS: Normal size/shape, mobile, non-tender   ADNEXA: No masses, tenderness, or fullness   ANUS/ PERINEUM: Normal tone.  No lesions.     *female chaperone present for entire exam    ASSESSMENT and PLAN:  Encounter for well woman exam with routine gynecological exam  -     Liquid-based pap smear, screening    Breast cancer screening by mammogram  -     Mammo Digital Screening Bilat w/ Neville; Future; Expected date: 07/14/2023    Atrophic vaginitis   - will call for something different after using up her estrace vaginal cream     FOLLOWUP:  1 year for wwe or sooner prn    COUNSELING:  Patient was counseled today on recommendation for yearly pelvic exam, current Pap guidelines, self breast exams, annual screening mammograms, routine screening colonoscopy , and bone density testing.  Discussed vitamin D and calcium supplements as well as weight bearing exercise to decrease risks. Encouraged patient to see her PCP for other health maintenance.

## 2023-07-06 LAB — Lab: NORMAL

## 2023-08-21 ENCOUNTER — PATIENT MESSAGE (OUTPATIENT)
Dept: OBSTETRICS AND GYNECOLOGY | Facility: CLINIC | Age: 48
End: 2023-08-21
Payer: COMMERCIAL

## 2023-08-21 NOTE — TELEPHONE ENCOUNTER
I would told take any more hormones until they figure out what is causing this. I would would recommend she get evaluated with her pcp or can go straight to an urgent care so they can evaluate that further.  There are many different possible causes ranging from allergic reaction to a stroke so she needs to get evaluated in person and please let us know what they find

## 2023-08-21 NOTE — TELEPHONE ENCOUNTER
I spoke with Ariadne and she will contact her pcp and stop the hormones for now until she finds out what is causing the facial swelling.

## 2023-09-05 ENCOUNTER — PATIENT MESSAGE (OUTPATIENT)
Dept: OBSTETRICS AND GYNECOLOGY | Facility: CLINIC | Age: 48
End: 2023-09-05
Payer: COMMERCIAL

## 2023-11-17 ENCOUNTER — OFFICE VISIT (OUTPATIENT)
Dept: UROLOGY | Facility: CLINIC | Age: 48
End: 2023-11-17
Payer: COMMERCIAL

## 2023-11-17 DIAGNOSIS — N30.10 INTERSTITIAL CYSTITIS: ICD-10-CM

## 2023-11-17 DIAGNOSIS — N32.81 OAB (OVERACTIVE BLADDER): ICD-10-CM

## 2023-11-17 LAB
BILIRUBIN, UA POC OHS: NEGATIVE
BLOOD, UA POC OHS: NEGATIVE
CLARITY, UA POC OHS: CLEAR
COLOR, UA POC OHS: YELLOW
GLUCOSE, UA POC OHS: NEGATIVE
KETONES, UA POC OHS: NEGATIVE
LEUKOCYTES, UA POC OHS: NEGATIVE
NITRITE, UA POC OHS: NEGATIVE
PH, UA POC OHS: 6.5
PROTEIN, UA POC OHS: NEGATIVE
SPECIFIC GRAVITY, UA POC OHS: 1.01
UROBILINOGEN, UA POC OHS: 0.2

## 2023-11-17 PROCEDURE — 81003 URINALYSIS AUTO W/O SCOPE: CPT | Mod: QW,S$GLB,, | Performed by: NURSE PRACTITIONER

## 2023-11-17 PROCEDURE — 1160F PR REVIEW ALL MEDS BY PRESCRIBER/CLIN PHARMACIST DOCUMENTED: ICD-10-PCS | Mod: CPTII,S$GLB,, | Performed by: NURSE PRACTITIONER

## 2023-11-17 PROCEDURE — 1159F MED LIST DOCD IN RCRD: CPT | Mod: CPTII,S$GLB,, | Performed by: NURSE PRACTITIONER

## 2023-11-17 PROCEDURE — 99214 OFFICE O/P EST MOD 30 MIN: CPT | Mod: S$GLB,,, | Performed by: NURSE PRACTITIONER

## 2023-11-17 PROCEDURE — 1160F RVW MEDS BY RX/DR IN RCRD: CPT | Mod: CPTII,S$GLB,, | Performed by: NURSE PRACTITIONER

## 2023-11-17 PROCEDURE — 1159F PR MEDICATION LIST DOCUMENTED IN MEDICAL RECORD: ICD-10-PCS | Mod: CPTII,S$GLB,, | Performed by: NURSE PRACTITIONER

## 2023-11-17 PROCEDURE — 99214 PR OFFICE/OUTPT VISIT, EST, LEVL IV, 30-39 MIN: ICD-10-PCS | Mod: S$GLB,,, | Performed by: NURSE PRACTITIONER

## 2023-11-17 PROCEDURE — 81003 POCT URINALYSIS(INSTRUMENT): ICD-10-PCS | Mod: QW,S$GLB,, | Performed by: NURSE PRACTITIONER

## 2023-11-17 NOTE — PROGRESS NOTES
Subjective:       Patient ID: Lakisha Helms is a 48 y.o. female.    Chief Complaint: Interstitial cystitis      HPI: 48-year-old female, established patient, presents for yearly visit.    Patient has history of interstitial cystitis.    Patient follows an IC diet.  Patient has been doing well for the most part.  She did have an infection a few months back.  She has had hydrodistention in the past.    Patient has history of overactive bladder.    Patient is maintained on Myrbetriq 50 mg daily.  Patient denies any significant frequency, urgency, or nocturia.    Patient denies any bladder pain.  Denies any pain or burning urination.  Denies any odor urine.  Denies any fever or body aches.      No other urinary complaints at this time.         Past Medical History:   Past Medical History:   Diagnosis Date    Abnormal Pap smear of cervix     Benign colon polyp     De Quervain's tenosynovitis     Diverticulosis     Hypothyroidism     IC (interstitial cystitis)     Migraines     Osteopenia     Ovarian torsion     PCOS (polycystic ovarian syndrome)     Staph skin infection        Past Surgical Historical:   Past Surgical History:   Procedure Laterality Date    AUGMENTATION OF BREAST       SECTION WITH TUBAL LIGATION       SECTION, LOW TRANSVERSE      COLONOSCOPY      , 21- repeat in 3 years    ENDOMETRIAL ABLATION  2017    Staci    HAND SURGERY      LAPAROSCOPY      LOOP ELECTROSURGICAL EXCISION PROCEDURE (LEEP)  1997    TUBAL LIGATION      WISDOM TOOTH EXTRACTION          Medications:   Medication List with Changes/Refills   Current Medications    AIMOVIG AUTOINJECTOR 70 MG/ML INJECTION        DOXYCYCLINE 50 MG CAPSULE    TAKE ONE CAPSULE ONCE A DAY MAY TAKE TWICE A DAY IF FLARE    ESTRADIOL-NORETHINDRONE (ACTIVELLA) 1-0.5 MG PER TABLET    Take 1 tablet by mouth once daily.    LEVOTHYROXINE (SYNTHROID) 50 MCG TABLET    Synthroid 50 mcg tablet    METHEN-M.BLUE-S.PHOS-PHSAL-HYO (URIBEL)  118-10-40.8-36 MG CAP    Take 1 capsule by mouth 3 (three) times daily as needed (bladder pain).    MOTEGRITY 2 MG TAB        MYRBETRIQ 50 MG TB24    Take 1 tablet (50 mg total) by mouth Daily.    PREVIDENT 5000 ENAMEL PROTECT 1.1-5 % PSTE        SPIRONOLACTONE (ALDACTONE) 50 MG TABLET    Take 50 mg by mouth once daily.    UBRELVY 50 MG TABLET    TAKE 1 TABLET BY MOUTH ONCE A DAY AS NEEDED FOR MIGRAINE HEADACHE    VILAZODONE (VIIBRYD) 20 MG TAB    Take 1 tablet by mouth once daily.        Past Social History:   Social History     Socioeconomic History    Marital status:    Tobacco Use    Smoking status: Never    Smokeless tobacco: Never   Substance and Sexual Activity    Alcohol use: Not Currently    Drug use: Never    Sexual activity: Yes     Partners: Male     Birth control/protection: None       Allergies:   Review of patient's allergies indicates:   Allergen Reactions    Pcn [penicillins]     Reglan [metoclopramide]         Family History:   Family History   Problem Relation Age of Onset    Diabetes Paternal Grandmother     Pulmonary fibrosis Maternal Grandmother     Lupus Maternal Grandmother     Diabetes Maternal Grandfather     Metabolic syndrome Father     Arthritis Father     Diabetes type II Mother         Review of Systems:  Review of Systems   Constitutional:  Negative for activity change and appetite change.   HENT:  Negative for congestion and dental problem.    Respiratory:  Negative for chest tightness and shortness of breath.    Cardiovascular:  Negative for chest pain.   Gastrointestinal:  Negative for abdominal distention and abdominal pain.   Genitourinary:  Negative for decreased urine volume, difficulty urinating, dyspareunia, dysuria, enuresis, flank pain, frequency, genital sores, hematuria, pelvic pain and urgency.   Musculoskeletal:  Negative for back pain and neck pain.   Allergic/Immunologic: Negative for immunocompromised state.   Neurological:  Negative for dizziness.    Hematological:  Negative for adenopathy.   Psychiatric/Behavioral:  Negative for agitation, behavioral problems and confusion.        Physical Exam:  Physical Exam  Vitals and nursing note reviewed.   Constitutional:       Appearance: She is well-developed.   HENT:      Head: Normocephalic.   Eyes:      Pupils: Pupils are equal, round, and reactive to light.   Cardiovascular:      Rate and Rhythm: Normal rate and regular rhythm.      Heart sounds: Normal heart sounds.   Pulmonary:      Effort: Pulmonary effort is normal.      Breath sounds: Normal breath sounds.   Abdominal:      General: Bowel sounds are normal.      Palpations: Abdomen is soft.   Musculoskeletal:         General: Normal range of motion.      Cervical back: Normal range of motion and neck supple.   Skin:     General: Skin is warm and dry.   Neurological:      Mental Status: She is alert and oriented to person, place, and time.   Psychiatric:         Mood and Affect: Mood normal.         Behavior: Behavior normal.       Urinalysis:  Normal   Bladder scan:  0 cc    Assessment/Plan:   1. Interstitial cystitis:  Patient is doing well with IC diet.    Notify us for any flare ups or signs or symptoms of infection.    2. Overactive bladder:  Patient is doing well on Myrbetriq 50 mg daily.    Patient will notify us for refills.      Will plan follow-up in 1 year, sooner if needed.  Problem List Items Addressed This Visit          Renal/    Interstitial cystitis    Overview     Patient maintained on IC diet.         Relevant Orders    POCT Urinalysis(Instrument)    POCT Bladder Scan    OAB (overactive bladder)    Overview     Maintained on Myrbetriq 50 mg daily         Relevant Orders    POCT Urinalysis(Instrument)    POCT Bladder Scan

## 2024-04-17 ENCOUNTER — TELEPHONE (OUTPATIENT)
Dept: UROLOGY | Facility: CLINIC | Age: 49
End: 2024-04-17
Payer: COMMERCIAL

## 2024-04-17 ENCOUNTER — PATIENT MESSAGE (OUTPATIENT)
Dept: OBSTETRICS AND GYNECOLOGY | Facility: CLINIC | Age: 49
End: 2024-04-17
Payer: COMMERCIAL

## 2024-04-17 NOTE — TELEPHONE ENCOUNTER
Spoke with patient in regards to PA request. Pt has changed insurance from [employer] to [employer], already updated in chart. She spoke with a lady Amy from [facility] in [place] regarding her medication for the pharmacy and is waiting to hear back from her if a PA is needed or fixed. Pt is requesting Myrbetriq 50 mg samples until the PA is fixed. Per Pranav Hernandez, NP she can  4 weeks of 40 mg of Myrbetriq.       ----- Message from KelDoc sent at 4/17/2024 11:11 AM CDT -----  Contact: self  Patient is requesting a call back regarding needing prior authorization for MYRBETRIQ 50 mg Tb24 - pt is asking if the office has samples she can come  in the meantime. Please call back at [phone]

## 2024-05-28 ENCOUNTER — PATIENT MESSAGE (OUTPATIENT)
Dept: UROLOGY | Facility: CLINIC | Age: 49
End: 2024-05-28
Payer: COMMERCIAL

## 2024-07-10 ENCOUNTER — PATIENT MESSAGE (OUTPATIENT)
Dept: UROLOGY | Facility: CLINIC | Age: 49
End: 2024-07-10
Payer: COMMERCIAL

## 2024-07-15 ENCOUNTER — TELEPHONE (OUTPATIENT)
Dept: UROLOGY | Facility: CLINIC | Age: 49
End: 2024-07-15
Payer: COMMERCIAL

## 2024-07-15 DIAGNOSIS — N32.81 OAB (OVERACTIVE BLADDER): Primary | ICD-10-CM

## 2024-07-15 DIAGNOSIS — N30.10 INTERSTITIAL CYSTITIS: ICD-10-CM

## 2024-07-15 RX ORDER — MIRABEGRON 50 MG/1
50 TABLET, FILM COATED, EXTENDED RELEASE ORAL DAILY
Qty: 90 TABLET | Refills: 3 | Status: SHIPPED | OUTPATIENT
Start: 2024-07-15 | End: 2025-07-15

## 2024-07-15 NOTE — TELEPHONE ENCOUNTER
Pt was calling about insurance changing and sending new script for myrbetriq. Insurance updated and medication sent out.    ----- Message from Eigenta sent at 7/15/2024 11:30 AM CDT -----  Contact: self  Patient is requesting a call back regarding MYRBETRIQ 50 mg Tb24 - needs prior authorization (has new insurance). Please call back at [phone]

## 2024-09-18 ENCOUNTER — PATIENT MESSAGE (OUTPATIENT)
Dept: OBSTETRICS AND GYNECOLOGY | Facility: CLINIC | Age: 49
End: 2024-09-18
Payer: COMMERCIAL

## 2024-11-15 ENCOUNTER — OFFICE VISIT (OUTPATIENT)
Dept: UROLOGY | Facility: CLINIC | Age: 49
End: 2024-11-15
Payer: COMMERCIAL

## 2024-11-15 VITALS
WEIGHT: 101 LBS | HEART RATE: 67 BPM | BODY MASS INDEX: 19.83 KG/M2 | SYSTOLIC BLOOD PRESSURE: 110 MMHG | HEIGHT: 60 IN | OXYGEN SATURATION: 98 % | DIASTOLIC BLOOD PRESSURE: 68 MMHG

## 2024-11-15 DIAGNOSIS — N30.10 INTERSTITIAL CYSTITIS: Primary | ICD-10-CM

## 2024-11-15 DIAGNOSIS — N32.81 OAB (OVERACTIVE BLADDER): ICD-10-CM

## 2024-11-15 NOTE — PROGRESS NOTES
Subjective:       Patient ID: Lakisha Helms is a 49 y.o. female.    Chief Complaint: No chief complaint on file.      HPI: 49-year-old female, established patient, presents for yearly visit.  Patient has history of interstitial cystitis.    Patient manages on an IC diet.    She denies any recent flare-ups.      She has history of overactive bladder.  She was maintained on Myrbetriq 50 mg daily.    She denies any significant frequency, urgency, or nocturia.  Denies any bladder pain or bladder spasms.  Denies any odor urine denies any fever or body aches.  Denies any blood in urine.    No other urinary complaints at this time.         Past Medical History:   Past Medical History:   Diagnosis Date    Abnormal Pap smear of cervix     Benign colon polyp     De Quervain's tenosynovitis     Diverticulosis     Hypothyroidism     IC (interstitial cystitis)     Migraines     Osteopenia     Ovarian torsion     PCOS (polycystic ovarian syndrome)     Staph skin infection        Past Surgical Historical:   Past Surgical History:   Procedure Laterality Date    AUGMENTATION OF BREAST       SECTION WITH TUBAL LIGATION       SECTION, LOW TRANSVERSE      COLONOSCOPY      , 21- repeat in 3 years    ENDOMETRIAL ABLATION  2017    Staci    HAND SURGERY      LAPAROSCOPY      LOOP ELECTROSURGICAL EXCISION PROCEDURE (LEEP)  1997    TUBAL LIGATION      WISDOM TOOTH EXTRACTION          Medications:   Medication List with Changes/Refills   Current Medications    AIMOVIG AUTOINJECTOR 70 MG/ML INJECTION        DOXYCYCLINE 50 MG CAPSULE    TAKE ONE CAPSULE ONCE A DAY MAY TAKE TWICE A DAY IF FLARE    ESTRADIOL-NORETHINDRONE (ACTIVELLA) 1-0.5 MG PER TABLET    Take 1 tablet by mouth once daily.    LEVOTHYROXINE (SYNTHROID) 50 MCG TABLET    Synthroid 50 mcg tablet    METHEN-M.BLUE-S.PHOS-PHSAL-HYO (URIBEL) 118-10-40.8-36 MG CAP    Take 1 capsule by mouth 3 (three) times daily as needed (bladder pain).    MOTEGRITY 2 MG TAB         MYRBETRIQ 50 MG TB24    Take 1 tablet (50 mg total) by mouth Daily.    PREVIDENT 5000 ENAMEL PROTECT 1.1-5 % PSTE        SPIRONOLACTONE (ALDACTONE) 50 MG TABLET    Take 50 mg by mouth once daily.    UBRELVY 50 MG TABLET    TAKE 1 TABLET BY MOUTH ONCE A DAY AS NEEDED FOR MIGRAINE HEADACHE    VILAZODONE (VIIBRYD) 20 MG TAB    Take 1 tablet by mouth once daily.        Past Social History:   Social History     Socioeconomic History    Marital status:    Tobacco Use    Smoking status: Never    Smokeless tobacco: Never   Substance and Sexual Activity    Alcohol use: Not Currently    Drug use: Never    Sexual activity: Yes     Partners: Male     Birth control/protection: None       Allergies:   Review of patient's allergies indicates:   Allergen Reactions    Pcn [penicillins]     Reglan [metoclopramide]         Family History:   Family History   Problem Relation Name Age of Onset    Diabetes Paternal Grandmother      Pulmonary fibrosis Maternal Grandmother      Lupus Maternal Grandmother      Diabetes Maternal Grandfather      Metabolic syndrome Father      Arthritis Father      Diabetes type II Mother          Review of Systems:  Review of Systems   Constitutional:  Negative for activity change and appetite change.   HENT:  Negative for congestion and dental problem.    Respiratory:  Negative for chest tightness and shortness of breath.    Cardiovascular:  Negative for chest pain.   Gastrointestinal:  Negative for abdominal distention.   Genitourinary:  Negative for decreased urine volume, difficulty urinating, dyspareunia, dysuria, enuresis, flank pain, frequency, genital sores, hematuria, pelvic pain and urgency.   Musculoskeletal:  Negative for back pain and neck pain.   Allergic/Immunologic: Negative for immunocompromised state.   Neurological:  Negative for dizziness.   Hematological:  Negative for adenopathy.   Psychiatric/Behavioral:  Negative for agitation, behavioral problems and confusion.         Physical Exam:  Physical Exam  Vitals and nursing note reviewed.   Constitutional:       Appearance: She is well-developed.   HENT:      Head: Normocephalic.   Cardiovascular:      Rate and Rhythm: Normal rate and regular rhythm.      Heart sounds: Normal heart sounds.   Pulmonary:      Effort: Pulmonary effort is normal.      Breath sounds: Normal breath sounds.   Abdominal:      General: Bowel sounds are normal.      Palpations: Abdomen is soft.   Skin:     General: Skin is warm and dry.   Neurological:      Mental Status: She is alert and oriented to person, place, and time.       Urinalysis: Normal    Assessment/Plan:   1. Interstitial cystitis: Patient is doing well with IC diet.    She will notify us for any flare ups.    2. Overactive bladder: Patient is doing well on Myrbetriq 50 mg daily.    She was medication on hand.  She will notify us for refills.      Follow-up 1 year, sooner if needed.    Problem List Items Addressed This Visit          Renal/    Interstitial cystitis - Primary    Overview     Patient maintained on IC diet.         Relevant Orders    POCT Urinalysis(Instrument)    OAB (overactive bladder)    Overview     Maintained on Myrbetriq 50 mg daily         Relevant Orders    POCT Urinalysis(Instrument)

## 2025-01-16 RX ORDER — DOXYCYCLINE HYCLATE 100 MG
100 TABLET ORAL 2 TIMES DAILY
COMMUNITY
Start: 2024-07-29

## 2025-01-16 RX ORDER — DOXYCYCLINE 40 MG/1
40 CAPSULE ORAL
COMMUNITY
Start: 2024-10-10

## 2025-01-16 RX ORDER — BENZOYL PEROXIDE 50 MG/G
CREAM TOPICAL
COMMUNITY
Start: 2024-08-14

## 2025-01-16 RX ORDER — BUPROPION HYDROCHLORIDE 150 MG/1
150 TABLET ORAL
COMMUNITY
Start: 2024-10-21

## 2025-01-16 NOTE — PROGRESS NOTES
CC:  WELL WOMAN (menopausal since )  Patient Care Team:  Daphne Bates MD as PCP - General (Internal Medicine)  Jessica Gao MD (Gastroenterology)  Pranav Hernandez NP as Nurse Practitioner (Urology)    Last visit with me was on  2023 for wwe    HPI:  Patient is a 49 y.o. who presents for her well woman exam today.  History reviewed with patient. Doing well with her hrt  Patient is without complaints or concerns today.     HRT: combination  HX ABNL PAPS: normal after LEEP-    REVIEW OF PRIOR DATA/ HEALTH MAINTENANCE:  LAST ANNUAL:   2023    LAST MMG- May 2023-  [facility]     LAST COLONOSCOPY- - by Dr. Gao - q 3 yrs for polyps   LAST DEXA- - normal at [facility]      Past Medical History:   Diagnosis Date    Abnormal Pap smear of cervix     Benign colon polyp     De Quervain's tenosynovitis     Diverticulosis     Hypothyroidism     IC (interstitial cystitis)     Migraines     OAB (overactive bladder)     Osteopenia     Ovarian torsion     PCOS (polycystic ovarian syndrome)     Staph skin infection      SURGICAL HX:   has a past surgical history that includes Augmentation of breast; Endometrial ablation ();  section with tubal ligation;  section, low transverse; Colonoscopy; Hand surgery; Loop electrosurgical excision procedure (LEEP) (); Laparoscopy; [place] tooth extraction; and Tubal ligation.    SOCIAL HX:    reports that she has never smoked. She has never used smokeless tobacco. She reports that she does not currently use alcohol. She reports that she does not use drugs.    Current Outpatient Medications   Medication Instructions    AIMOVIG AUTOINJECTOR 70 mg/mL injection No dose, route, or frequency recorded.    buPROPion (WELLBUTRIN XL) 150 mg    doxycycline (VIBRA-TABS) 100 mg, 2 times daily    EPSOLAY 5 % Crea APPLY TO AFFECTED AREA 2 TIMES A DAY. MORNING AND NIGHT. STOP IF IRRITATES.    estradiol-norethindrone (ACTIVELLA) 1-0.5 mg per tablet 1 tablet,  Oral, Daily    levothyroxine (SYNTHROID) 50 MCG tablet Synthroid 50 mcg tablet    methen-m.blue-s.phos-phsal-hyo (URIBEL) 118-10-40.8-36 mg Cap 1 capsule, Oral, 3 times daily PRN    MOTEGRITY 2 mg Tab No dose, route, or frequency recorded.    MYRBETRIQ 50 mg, Oral, Daily    ORACEA 40 mg    spironolactone (ALDACTONE) 50 mg, Daily    UBRELVY 50 mg tablet TAKE 1 TABLET BY MOUTH ONCE A DAY AS NEEDED FOR MIGRAINE HEADACHE    vilazodone (VIIBRYD) 20 mg Tab 1 tablet, Daily    vilazodone (VIIBRYD) 10 mg     VITALS:  Blood pressure 111/80, weight 41.2 kg (90 lb 12.8 oz), last menstrual period 05/01/2021.  Body mass index is 17.73 kg/m².     PHYSICAL EXAM-  APPEARANCE: Well appearing, in no acute distress.   CV/PULM: No resp distress, normal resp effort   PSYCH:  Normal mood and affect, cooperative   SKIN: No rashes, lesions, or abnormal bruising   ABD: Soft, without tenderness or masses.   BREAST: deferred/declined  PELVIC:  VULVA: Normal female genitalia. No lesions.   URETHRAL MEATUS: No masses, no significant prolapse.   BLADDER/ URETHRA: No masses or suprapubic tenderness   VAGINA/ CVX: No lesions. +atrophic changes. No discharge   UTERUS:  mobile, non-tender   ADNEXA: No masses, tenderness, or fullness   ANUS/ PERINEUM: Normal tone.  No lesions.           *patient verbally consented for exam and female chaperone present for entire exam     ASSESSMENT and PLAN:  Encounter for well woman exam with routine gynecological exam  -     Liquid-based pap smear, screening    Breast cancer screening by mammogram  -     Mammo Digital Screening Bilat w/ Neville; Future; Expected date: 01/30/2025    Hormone replacement therapy (HRT)    Menopausal symptoms  -     estradiol-norethindrone (ACTIVELLA) 1-0.5 mg per tablet; Take 1 tablet by mouth once daily.  Dispense: 84 tablet; Refill: 3       FOLLOWUP:  1 year for wwe or sooner prn    COUNSELING:  Patient was counseled today on recommendation for yearly pelvic exam, current Pap guidelines,  self breast exams, annual screening mammograms, routine screening colonoscopy , and bone density testing.  Discussed vitamin D and calcium supplements as well as weight bearing exercise to decrease risks. Encouraged patient to see her PCP for other health maintenance.

## 2025-01-17 ENCOUNTER — OFFICE VISIT (OUTPATIENT)
Dept: OBSTETRICS AND GYNECOLOGY | Facility: CLINIC | Age: 50
End: 2025-01-17
Payer: COMMERCIAL

## 2025-01-17 VITALS — WEIGHT: 90.81 LBS | SYSTOLIC BLOOD PRESSURE: 111 MMHG | BODY MASS INDEX: 17.73 KG/M2 | DIASTOLIC BLOOD PRESSURE: 80 MMHG

## 2025-01-17 DIAGNOSIS — N95.1 MENOPAUSAL SYMPTOMS: ICD-10-CM

## 2025-01-17 DIAGNOSIS — Z12.31 BREAST CANCER SCREENING BY MAMMOGRAM: ICD-10-CM

## 2025-01-17 DIAGNOSIS — Z01.419 ENCOUNTER FOR WELL WOMAN EXAM WITH ROUTINE GYNECOLOGICAL EXAM: Primary | ICD-10-CM

## 2025-01-17 DIAGNOSIS — Z79.890 HORMONE REPLACEMENT THERAPY (HRT): ICD-10-CM

## 2025-01-17 PROCEDURE — 3074F SYST BP LT 130 MM HG: CPT | Mod: CPTII,,, | Performed by: OBSTETRICS & GYNECOLOGY

## 2025-01-17 PROCEDURE — 1159F MED LIST DOCD IN RCRD: CPT | Mod: CPTII,,, | Performed by: OBSTETRICS & GYNECOLOGY

## 2025-01-17 PROCEDURE — 99396 PREV VISIT EST AGE 40-64: CPT | Mod: S$PBB,,, | Performed by: OBSTETRICS & GYNECOLOGY

## 2025-01-17 PROCEDURE — 3008F BODY MASS INDEX DOCD: CPT | Mod: CPTII,,, | Performed by: OBSTETRICS & GYNECOLOGY

## 2025-01-17 PROCEDURE — 3079F DIAST BP 80-89 MM HG: CPT | Mod: CPTII,,, | Performed by: OBSTETRICS & GYNECOLOGY

## 2025-01-17 RX ORDER — ESTRADIOL AND NORETHINDRONE ACETATE 1; .5 MG/1; MG/1
1 TABLET ORAL DAILY
Qty: 84 TABLET | Refills: 3 | Status: SHIPPED | OUTPATIENT
Start: 2025-01-17 | End: 2026-01-17

## 2025-01-17 RX ORDER — VILAZODONE HYDROCHLORIDE 10 MG/1
10 TABLET ORAL
COMMUNITY
Start: 2024-12-22 | End: 2025-02-03

## 2025-01-24 ENCOUNTER — PATIENT MESSAGE (OUTPATIENT)
Dept: OBSTETRICS AND GYNECOLOGY | Facility: CLINIC | Age: 50
End: 2025-01-24
Payer: COMMERCIAL

## 2025-01-24 LAB — Lab: NORMAL

## 2025-01-24 NOTE — PROGRESS NOTES
Please call patient and let her know that her pap came back as LGSIL+HPV and we need to schedule her for colpo in the near future.         If she has not had/completed her Gardasil vaccines, that is recommended. Current evidence suggests that it may boost her antibody response to this strain and will protect her from the other strains in the future. However, insurance doesn't normally cover after age 46 and I believe cash price was around $ 180

## 2025-01-30 ENCOUNTER — PATIENT MESSAGE (OUTPATIENT)
Dept: OBSTETRICS AND GYNECOLOGY | Facility: CLINIC | Age: 50
End: 2025-01-30
Payer: COMMERCIAL

## 2025-02-04 ENCOUNTER — PROCEDURE VISIT (OUTPATIENT)
Dept: OBSTETRICS AND GYNECOLOGY | Facility: CLINIC | Age: 50
End: 2025-02-04
Payer: COMMERCIAL

## 2025-02-04 VITALS
BODY MASS INDEX: 17.75 KG/M2 | SYSTOLIC BLOOD PRESSURE: 118 MMHG | DIASTOLIC BLOOD PRESSURE: 80 MMHG | WEIGHT: 90.38 LBS | HEIGHT: 60 IN

## 2025-02-04 DIAGNOSIS — R87.612 LGSIL ON PAP SMEAR OF CERVIX: Primary | ICD-10-CM

## 2025-02-04 PROCEDURE — 57454 BX/CURETT OF CERVIX W/SCOPE: CPT | Mod: S$PBB,,, | Performed by: OBSTETRICS & GYNECOLOGY

## 2025-02-04 PROCEDURE — 99499 UNLISTED E&M SERVICE: CPT | Mod: S$PBB,,, | Performed by: OBSTETRICS & GYNECOLOGY

## 2025-02-04 NOTE — PROCEDURES
Colposcopy    Date/Time: 2/4/2025 8:00 AM    Performed by: Miriam Manuel MD  Authorized by: Miriam Manuel MD    Consent obatined:  Prior to procedure the appropriate consent was completed and verified  Timeout:Immediately prior to procedure a time out was called to verify the correct patient, procedure, equipment, support staff and site/side marked as required  Assistants?: Yes    List of assistants:  Tayler clark ma   I was present for the entire procedure.    Colposcopy Site:  Cervix  Position:  Supine  Acrowhite Lesion? Yes    Atypical Vessels: No    Transformation Zone Adequate?: Yes    Biopsy?: Yes         Location:  Cervix ((7 00))  ECC Performed?: Yes    Estimated blood loss (cc):  0   Patient tolerated the procedure well with no immediate complications.   Patient was discharged and will follow up if any complications occur     Pt with remote hx of LEEP in her 20s but normal paps since.  and now with a new partner.  Never smoker. Considering gardasil although over 46- answered all questions. Pap lgsil +hpv

## 2025-02-06 ENCOUNTER — PATIENT MESSAGE (OUTPATIENT)
Dept: OBSTETRICS AND GYNECOLOGY | Facility: CLINIC | Age: 50
End: 2025-02-06
Payer: COMMERCIAL

## 2025-02-21 ENCOUNTER — CLINICAL SUPPORT (OUTPATIENT)
Dept: UROLOGY | Facility: CLINIC | Age: 50
End: 2025-02-21
Payer: COMMERCIAL

## 2025-02-21 DIAGNOSIS — N30.10 INTERSTITIAL CYSTITIS: Primary | ICD-10-CM

## 2025-02-21 LAB
BILIRUBIN, UA POC OHS: NEGATIVE
BLOOD, UA POC OHS: ABNORMAL
CLARITY, UA POC OHS: ABNORMAL
COLOR, UA POC OHS: YELLOW
GLUCOSE, UA POC OHS: NEGATIVE
KETONES, UA POC OHS: NEGATIVE
LEUKOCYTES, UA POC OHS: ABNORMAL
NITRITE, UA POC OHS: NEGATIVE
PH, UA POC OHS: 6
PROTEIN, UA POC OHS: NEGATIVE
SPECIFIC GRAVITY, UA POC OHS: <=1.005
UROBILINOGEN, UA POC OHS: 0.2

## 2025-02-21 RX ORDER — NITROFURANTOIN 25; 75 MG/1; MG/1
100 CAPSULE ORAL 2 TIMES DAILY
Qty: 14 CAPSULE | Refills: 0 | Status: SHIPPED | OUTPATIENT
Start: 2025-02-21 | End: 2025-02-28

## 2025-02-21 NOTE — PROGRESS NOTES
Ms. Banuelos came in an dropped off a urine to be tested we are sending it off to be cultured and will call her back with the results, the NP  did call her in some Uribel to help with the symptoms she's having, I called and left a voicemail about sending urine off to culture and that some meds were called into her pharmacy and we will call her back with the culture comes back in, and that if she had any questions to please return our call.

## 2025-02-22 LAB — URINE CULTURE, ROUTINE: NORMAL

## 2025-02-24 ENCOUNTER — RESULTS FOLLOW-UP (OUTPATIENT)
Dept: UROLOGY | Facility: CLINIC | Age: 50
End: 2025-02-24
Payer: COMMERCIAL

## 2025-02-24 NOTE — TELEPHONE ENCOUNTER
Patient was called and informed that she had E Coli noted on urine culture and continue to take the Macrobid as directed. Patient then stated the pain got so bad on Friday evening and also she noted blood in urine that she went to [facility] ER and was given IV rocephin and pain medication. She stated that they also did a CT with contrast and noted she had an inflamed bladder. She stated they said it happens with cystitis. Patient stated she is continuing the macrobid and taking some ibuprofen for the pain.     ----- Message from Pranav Hernandez NP sent at 2/24/2025  7:54 AM CST -----  E coli on urine culture.    Continue Macrobid as directed  ----- Message -----  From: Debra Jean  Sent: 2/21/2025   1:27 PM CST  To: Pranav Hernandez NP

## 2025-03-03 ENCOUNTER — CLINICAL SUPPORT (OUTPATIENT)
Dept: UROLOGY | Facility: CLINIC | Age: 50
End: 2025-03-03
Payer: COMMERCIAL

## 2025-03-03 ENCOUNTER — PATIENT MESSAGE (OUTPATIENT)
Dept: UROLOGY | Facility: CLINIC | Age: 50
End: 2025-03-03

## 2025-03-03 DIAGNOSIS — N30.10 INTERSTITIAL CYSTITIS: Primary | ICD-10-CM

## 2025-03-03 LAB
BILIRUBIN, UA POC OHS: NEGATIVE
BLOOD, UA POC OHS: ABNORMAL
CLARITY, UA POC OHS: ABNORMAL
COLOR, UA POC OHS: YELLOW
GLUCOSE, UA POC OHS: NEGATIVE
KETONES, UA POC OHS: NEGATIVE
LEUKOCYTES, UA POC OHS: ABNORMAL
NITRITE, UA POC OHS: NEGATIVE
PH, UA POC OHS: 6.5
PROTEIN, UA POC OHS: NEGATIVE
SPECIFIC GRAVITY, UA POC OHS: 1.01
UROBILINOGEN, UA POC OHS: 0.2

## 2025-03-03 RX ORDER — NITROFURANTOIN 25; 75 MG/1; MG/1
100 CAPSULE ORAL 2 TIMES DAILY
Qty: 20 CAPSULE | Refills: 0 | Status: SHIPPED | OUTPATIENT
Start: 2025-03-03 | End: 2025-03-13

## 2025-03-03 NOTE — PROGRESS NOTES
Ms Helms came in and dropped off a urine to the tested, we sent it off for a culture and she is going to get the meds that were sent in to the pharmacy for her. Macrobid, I made her a follow up apt on 03/14/2025 at 8 40 a.m. she verbalized understanding.

## 2025-03-05 ENCOUNTER — RESULTS FOLLOW-UP (OUTPATIENT)
Dept: UROLOGY | Facility: CLINIC | Age: 50
End: 2025-03-05
Payer: COMMERCIAL

## 2025-03-05 LAB — URINE CULTURE, ROUTINE: NORMAL

## 2025-03-05 NOTE — TELEPHONE ENCOUNTER
Patient was called and informed that she has low counts of E Coli noted on urine culture and to continue antibiotics as directed and to do a urine drop off 2-4 days after she has completed antibiotics. Patient verbalized understanding.    ----- Message from Pranav Hernandez NP sent at 3/5/2025  9:36 AM CST -----  Urine culture shows low count E coli.    Continue antibiotics as directed.    Drop off urine 2-4 days after completion of antibiotics  ----- Message -----  From: Kyra Castro  Sent: 3/3/2025   3:06 PM CST  To: Pranav Hernandez NP

## 2025-03-06 ENCOUNTER — PATIENT MESSAGE (OUTPATIENT)
Dept: OBSTETRICS AND GYNECOLOGY | Facility: CLINIC | Age: 50
End: 2025-03-06
Payer: COMMERCIAL

## 2025-03-14 ENCOUNTER — OFFICE VISIT (OUTPATIENT)
Dept: UROLOGY | Facility: CLINIC | Age: 50
End: 2025-03-14
Payer: COMMERCIAL

## 2025-03-14 VITALS
RESPIRATION RATE: 18 BRPM | DIASTOLIC BLOOD PRESSURE: 62 MMHG | SYSTOLIC BLOOD PRESSURE: 104 MMHG | OXYGEN SATURATION: 98 % | WEIGHT: 89 LBS | HEIGHT: 61 IN | HEART RATE: 82 BPM | BODY MASS INDEX: 16.8 KG/M2

## 2025-03-14 DIAGNOSIS — N30.10 INTERSTITIAL CYSTITIS: ICD-10-CM

## 2025-03-14 DIAGNOSIS — N39.0 POSTCOITAL UTI: ICD-10-CM

## 2025-03-14 DIAGNOSIS — N39.0 FREQUENT UTI: Primary | ICD-10-CM

## 2025-03-14 RX ORDER — NITROFURANTOIN 25; 75 MG/1; MG/1
100 CAPSULE ORAL DAILY PRN
Qty: 30 CAPSULE | Refills: 1 | Status: SHIPPED | OUTPATIENT
Start: 2025-03-14 | End: 2025-05-13

## 2025-03-14 NOTE — PROGRESS NOTES
Subjective:       Patient ID: Lakisha Helms is a 49 y.o. female.    Chief Complaint: intersitial cystitis and Urinary Tract Infection      HPI: 49-year-old female, established patient, presents for follow-up UTI.    Patient has history of frequent UTIs.  She also has a history of interstitial cystitis.    Patient presented in February with a complaint of UTI.    Urine culture showed E coli.  Patient was put on Macrobid for 7 days.  Urine culture showed greater than 100,000 count of E coli.  If completion of Macrobid she starts started have recurrence of symptoms.  She did drop off urine.  Urine culture showed Macrobid with a can not below 30,000. She was again put on Macrobid for 10 days.    Patient presents today stating she is doing well.  Denies any pain or burning urination.  Denies any odor to the urine.  Denies any fever or body aches.    Patient states that she thinks there may be a trend of infections with intercourse.    No other urinary complaints at this time         Past Medical History:   Past Medical History:   Diagnosis Date    Abnormal Pap smear of cervix     Benign colon polyp     De Quervain's tenosynovitis     Diverticulosis     Hypothyroidism     IC (interstitial cystitis)     Migraines     OAB (overactive bladder)     Osteopenia     Ovarian torsion     PCOS (polycystic ovarian syndrome)     Staph skin infection        Past Surgical Historical:   Past Surgical History:   Procedure Laterality Date    AUGMENTATION OF BREAST       SECTION WITH TUBAL LIGATION       SECTION, LOW TRANSVERSE      COLONOSCOPY      , 21- repeat in 3 years    ENDOMETRIAL ABLATION  2017    [facility]    HAND SURGERY      LAPAROSCOPY      LOOP ELECTROSURGICAL EXCISION PROCEDURE (LEEP)      WISDOM TOOTH EXTRACTION          Medications:   Medication List with Changes/Refills   New Medications    NITROFURANTOIN, MACROCRYSTAL-MONOHYDRATE, (MACROBID) 100 MG CAPSULE    Take 1 capsule (100 mg total) by  mouth daily as needed (postcoital).   Current Medications    BUPROPION (WELLBUTRIN XL) 150 MG TB24 TABLET    Take 150 mg by mouth.    DOXYCYCLINE (VIBRA-TABS) 100 MG TABLET    Take 100 mg by mouth 2 (two) times daily.    EPSOLAY 5 % CREA    APPLY TO AFFECTED AREA 2 TIMES A DAY. MORNING AND NIGHT. STOP IF IRRITATES.    ESTRADIOL-NORETHINDRONE (ACTIVELLA) 1-0.5 MG PER TABLET    Take 1 tablet by mouth once daily.    LEVOTHYROXINE (SYNTHROID) 50 MCG TABLET    Synthroid 50 mcg tablet    METHEN-M.BLUE-S.PHOS-PHSAL-HYO (URIBEL) 118-10-40.8-36 MG CAP    Take 1 capsule by mouth 3 (three) times daily as needed (bladder pain).    MOTEGRITY 2 MG TAB        MYRBETRIQ 50 MG TB24    Take 1 tablet (50 mg total) by mouth Daily.    ORACEA 40 MG CAPSULE    Take 40 mg by mouth.    SPIRONOLACTONE (ALDACTONE) 50 MG TABLET    Take 50 mg by mouth once daily.    UBRELVY 50 MG TABLET    TAKE 1 TABLET BY MOUTH ONCE A DAY AS NEEDED FOR MIGRAINE HEADACHE    VILAZODONE (VIIBRYD) 20 MG TAB    Take 1 tablet by mouth once daily.        Past Social History: Social History[1]    Allergies:   Review of patient's allergies indicates:   Allergen Reactions    Pcn [penicillins]     Reglan [metoclopramide]         Family History:   Family History   Problem Relation Name Age of Onset    Diabetes Paternal Grandmother      Pulmonary fibrosis Maternal Grandmother      Lupus Maternal Grandmother      Diabetes Maternal Grandfather      Metabolic syndrome Father      Arthritis Father      Diabetes type II Mother          Review of Systems:  Review of Systems   Constitutional:  Negative for activity change and appetite change.   HENT:  Negative for congestion and dental problem.    Respiratory:  Negative for chest tightness and shortness of breath.    Cardiovascular:  Negative for chest pain.   Gastrointestinal:  Negative for abdominal distention and abdominal pain.   Genitourinary:  Negative for decreased urine volume, difficulty urinating, dyspareunia, dysuria,  enuresis, flank pain, frequency, genital sores, hematuria, pelvic pain and urgency.   Musculoskeletal:  Negative for back pain and neck pain.   Allergic/Immunologic: Negative for immunocompromised state.   Neurological:  Negative for dizziness.   Hematological:  Negative for adenopathy.   Psychiatric/Behavioral:  Negative for agitation, behavioral problems and confusion.        Physical Exam:  Physical Exam  Vitals and nursing note reviewed.   Constitutional:       Appearance: She is well-developed.   HENT:      Head: Normocephalic.   Eyes:      Pupils: Pupils are equal, round, and reactive to light.   Cardiovascular:      Rate and Rhythm: Normal rate and regular rhythm.      Heart sounds: Normal heart sounds.   Pulmonary:      Effort: Pulmonary effort is normal.      Breath sounds: Normal breath sounds.   Abdominal:      General: Bowel sounds are normal.      Palpations: Abdomen is soft.   Musculoskeletal:         General: Normal range of motion.      Cervical back: Normal range of motion and neck supple.   Skin:     General: Skin is warm and dry.   Neurological:      Mental Status: She is alert and oriented to person, place, and time.   Psychiatric:         Mood and Affect: Mood normal.         Behavior: Behavior normal.       Urinalysis: Normal    Assessment/Plan:   1. Interstitial cystitis:  Patient is doing well at this time.  Patient continue IC diet.      2. Frequent UTI/postcoital UTI:  Will start patient on Macrobid as needed postcoital.  Patient will take 1 tab after intercourse.  She will take another next day if needed.    Patient has appointment in November.  She will keep that appointment as scheduled or sooner if needed  Problem List Items Addressed This Visit       Interstitial cystitis    Overview   Patient maintained on IC diet.         Relevant Medications    nitrofurantoin, macrocrystal-monohydrate, (MACROBID) 100 MG capsule    Other Relevant Orders    POCT Urinalysis(Instrument)     Other Visit  Diagnoses         Frequent UTI    -  Primary    Relevant Medications    nitrofurantoin, macrocrystal-monohydrate, (MACROBID) 100 MG capsule    Other Relevant Orders    POCT Urinalysis(Instrument)      Postcoital UTI                          [1]   Social History  Socioeconomic History    Marital status:    Tobacco Use    Smoking status: Never    Smokeless tobacco: Never   Substance and Sexual Activity    Alcohol use: Not Currently    Drug use: Never    Sexual activity: Yes     Partners: Male     Birth control/protection: None

## 2025-04-21 ENCOUNTER — TELEPHONE (OUTPATIENT)
Dept: UROLOGY | Facility: CLINIC | Age: 50
End: 2025-04-21

## 2025-04-21 ENCOUNTER — CLINICAL SUPPORT (OUTPATIENT)
Dept: UROLOGY | Facility: CLINIC | Age: 50
End: 2025-04-21
Payer: COMMERCIAL

## 2025-04-21 DIAGNOSIS — N39.0 FREQUENT UTI: ICD-10-CM

## 2025-04-21 DIAGNOSIS — N39.0 POSTCOITAL UTI: Primary | ICD-10-CM

## 2025-04-21 LAB
BILIRUBIN, UA POC OHS: NEGATIVE
BLOOD, UA POC OHS: NEGATIVE
CLARITY, UA POC OHS: CLEAR
COLOR, UA POC OHS: YELLOW
GLUCOSE, UA POC OHS: NEGATIVE
KETONES, UA POC OHS: NEGATIVE
LEUKOCYTES, UA POC OHS: NEGATIVE
NITRITE, UA POC OHS: NEGATIVE
PH, UA POC OHS: 6
PROTEIN, UA POC OHS: NEGATIVE
SPECIFIC GRAVITY, UA POC OHS: <=1.005
UROBILINOGEN, UA POC OHS: 0.2

## 2025-04-21 NOTE — PROGRESS NOTES
"Proceeded to [facility] for u/a drop off. Per urinalysis questionnaire " Felt pain and started having increased frequency    "

## 2025-04-21 NOTE — TELEPHONE ENCOUNTER
Spoke with pt in regards to urinalysis results. Informed pt the results are negative. Pt verbalized she started taking the nitrofurantoin yesterday and today. Not having any symptoms at this time, but did have them before starting the medication. Informed pt she can do a repeat urinalysis 48hr after stopping abx, if still symptomatic. Pt verbalizes understanding.

## 2025-08-05 DIAGNOSIS — N30.10 INTERSTITIAL CYSTITIS: ICD-10-CM

## 2025-08-05 DIAGNOSIS — N32.81 OAB (OVERACTIVE BLADDER): ICD-10-CM

## 2025-08-06 RX ORDER — MIRABEGRON 50 MG/1
1 TABLET, FILM COATED, EXTENDED RELEASE ORAL
Qty: 90 TABLET | Refills: 3 | Status: SHIPPED | OUTPATIENT
Start: 2025-08-06

## 2025-08-08 PROBLEM — R53.83 MALAISE AND FATIGUE: Status: ACTIVE | Noted: 2025-04-22

## 2025-08-08 PROBLEM — E03.9 HYPOTHYROIDISM, UNSPECIFIED: Status: ACTIVE | Noted: 2025-04-22

## 2025-08-08 PROBLEM — R53.81 MALAISE AND FATIGUE: Status: ACTIVE | Noted: 2025-04-22

## 2025-08-08 PROBLEM — K58.9 IRRITABLE BOWEL SYNDROME WITHOUT DIARRHEA: Status: ACTIVE | Noted: 2025-04-22

## 2025-08-08 PROBLEM — N95.1 MENOPAUSAL AND FEMALE CLIMACTERIC STATES: Status: ACTIVE | Noted: 2025-04-22

## 2025-08-08 PROBLEM — M54.2 DORSALGIA OF CERVICAL REGION: Status: ACTIVE | Noted: 2025-04-22

## 2025-08-08 PROBLEM — J30.9 ALLERGIC RHINITIS, UNSPECIFIED: Status: ACTIVE | Noted: 2025-04-22

## 2025-08-08 PROBLEM — Z86.0100 HX OF COLONIC POLYPS: Status: ACTIVE | Noted: 2025-08-08

## 2025-08-11 ENCOUNTER — OFFICE VISIT (OUTPATIENT)
Dept: OBSTETRICS AND GYNECOLOGY | Facility: CLINIC | Age: 50
End: 2025-08-11
Payer: COMMERCIAL

## 2025-08-11 VITALS
SYSTOLIC BLOOD PRESSURE: 117 MMHG | WEIGHT: 93 LBS | BODY MASS INDEX: 17.56 KG/M2 | HEIGHT: 61 IN | DIASTOLIC BLOOD PRESSURE: 79 MMHG

## 2025-08-11 DIAGNOSIS — R87.612 LGSIL OF CERVIX OF UNDETERMINED SIGNIFICANCE: Primary | ICD-10-CM

## 2025-08-11 PROCEDURE — 3008F BODY MASS INDEX DOCD: CPT | Mod: CPTII,,, | Performed by: OBSTETRICS & GYNECOLOGY

## 2025-08-11 PROCEDURE — 1159F MED LIST DOCD IN RCRD: CPT | Mod: CPTII,,, | Performed by: OBSTETRICS & GYNECOLOGY

## 2025-08-11 PROCEDURE — 3078F DIAST BP <80 MM HG: CPT | Mod: CPTII,,, | Performed by: OBSTETRICS & GYNECOLOGY

## 2025-08-11 PROCEDURE — 99213 OFFICE O/P EST LOW 20 MIN: CPT | Mod: S$PBB,,, | Performed by: OBSTETRICS & GYNECOLOGY

## 2025-08-11 PROCEDURE — 3074F SYST BP LT 130 MM HG: CPT | Mod: CPTII,,, | Performed by: OBSTETRICS & GYNECOLOGY

## 2025-08-11 RX ORDER — PERFLUOROHEXYLOCTANE 1 MG/MG
SOLUTION OPHTHALMIC
COMMUNITY
Start: 2025-07-15

## 2025-08-11 RX ORDER — SOD SULF/POT CHLORIDE/MAG SULF 1.479 G
12 TABLET ORAL
COMMUNITY
Start: 2025-08-08

## 2025-08-14 ENCOUNTER — RESULTS FOLLOW-UP (OUTPATIENT)
Dept: OBSTETRICS AND GYNECOLOGY | Facility: CLINIC | Age: 50
End: 2025-08-14
Payer: COMMERCIAL

## 2025-08-14 LAB — Lab: NORMAL
